# Patient Record
Sex: FEMALE
[De-identification: names, ages, dates, MRNs, and addresses within clinical notes are randomized per-mention and may not be internally consistent; named-entity substitution may affect disease eponyms.]

---

## 2019-11-18 PROBLEM — Z00.00 ENCOUNTER FOR PREVENTIVE HEALTH EXAMINATION: Status: ACTIVE | Noted: 2019-11-18

## 2019-11-19 ENCOUNTER — APPOINTMENT (OUTPATIENT)
Dept: GYNECOLOGIC ONCOLOGY | Facility: CLINIC | Age: 79
End: 2019-11-19

## 2020-01-01 ENCOUNTER — EMERGENCY (EMERGENCY)
Facility: HOSPITAL | Age: 80
LOS: 1 days | Discharge: ANOTHER HOSPITAL | End: 2020-01-01
Attending: EMERGENCY MEDICINE | Admitting: EMERGENCY MEDICINE
Payer: MEDICARE

## 2020-01-01 ENCOUNTER — INPATIENT (INPATIENT)
Facility: HOSPITAL | Age: 80
LOS: 3 days | DRG: 871 | End: 2020-05-01
Attending: INTERNAL MEDICINE | Admitting: INTERNAL MEDICINE
Payer: MEDICARE

## 2020-01-01 VITALS
OXYGEN SATURATION: 70 % | DIASTOLIC BLOOD PRESSURE: 27 MMHG | RESPIRATION RATE: 32 BRPM | HEART RATE: 39 BPM | SYSTOLIC BLOOD PRESSURE: 56 MMHG

## 2020-01-01 VITALS
TEMPERATURE: 98 F | OXYGEN SATURATION: 97 % | RESPIRATION RATE: 20 BRPM | HEART RATE: 101 BPM | DIASTOLIC BLOOD PRESSURE: 50 MMHG | HEIGHT: 61 IN | WEIGHT: 100.09 LBS | SYSTOLIC BLOOD PRESSURE: 95 MMHG

## 2020-01-01 VITALS
HEART RATE: 90 BPM | WEIGHT: 99.21 LBS | RESPIRATION RATE: 20 BRPM | SYSTOLIC BLOOD PRESSURE: 95 MMHG | TEMPERATURE: 100 F | OXYGEN SATURATION: 95 % | DIASTOLIC BLOOD PRESSURE: 59 MMHG

## 2020-01-01 VITALS
OXYGEN SATURATION: 96 % | SYSTOLIC BLOOD PRESSURE: 115 MMHG | DIASTOLIC BLOOD PRESSURE: 58 MMHG | HEART RATE: 96 BPM | TEMPERATURE: 103 F

## 2020-01-01 DIAGNOSIS — J12.81 PNEUMONIA DUE TO SARS-ASSOCIATED CORONAVIRUS: ICD-10-CM

## 2020-01-01 DIAGNOSIS — U07.1 COVID-19: ICD-10-CM

## 2020-01-01 DIAGNOSIS — I95.9 HYPOTENSION, UNSPECIFIED: ICD-10-CM

## 2020-01-01 DIAGNOSIS — N17.9 ACUTE KIDNEY FAILURE, UNSPECIFIED: ICD-10-CM

## 2020-01-01 DIAGNOSIS — J96.01 ACUTE RESPIRATORY FAILURE WITH HYPOXIA: ICD-10-CM

## 2020-01-01 DIAGNOSIS — N18.9 CHRONIC KIDNEY DISEASE, UNSPECIFIED: ICD-10-CM

## 2020-01-01 DIAGNOSIS — Z71.89 OTHER SPECIFIED COUNSELING: ICD-10-CM

## 2020-01-01 DIAGNOSIS — R41.82 ALTERED MENTAL STATUS, UNSPECIFIED: ICD-10-CM

## 2020-01-01 LAB
ABO RH CONFIRMATION: SIGNIFICANT CHANGE UP
ALBUMIN SERPL ELPH-MCNC: 1.8 G/DL — LOW (ref 3.3–5)
ALBUMIN SERPL ELPH-MCNC: 2.5 G/DL — LOW (ref 3.3–5)
ALBUMIN SERPL ELPH-MCNC: 2.7 G/DL — LOW (ref 3.3–5)
ALBUMIN SERPL ELPH-MCNC: 3.2 G/DL — LOW (ref 3.3–5)
ALP SERPL-CCNC: 142 U/L — HIGH (ref 40–120)
ALP SERPL-CCNC: 159 U/L — HIGH (ref 30–120)
ALP SERPL-CCNC: 168 U/L — HIGH (ref 40–120)
ALP SERPL-CCNC: 210 U/L — HIGH (ref 40–120)
ALT FLD-CCNC: 141 U/L — HIGH (ref 12–78)
ALT FLD-CCNC: 37 U/L — SIGNIFICANT CHANGE UP (ref 12–78)
ALT FLD-CCNC: 48 U/L — SIGNIFICANT CHANGE UP (ref 12–78)
ALT FLD-CCNC: 49 U/L DA — SIGNIFICANT CHANGE UP (ref 10–60)
ANION GAP SERPL CALC-SCNC: 14 MMOL/L — SIGNIFICANT CHANGE UP (ref 5–17)
ANION GAP SERPL CALC-SCNC: 15 MMOL/L — SIGNIFICANT CHANGE UP (ref 5–17)
ANION GAP SERPL CALC-SCNC: 16 MMOL/L — SIGNIFICANT CHANGE UP (ref 5–17)
ANION GAP SERPL CALC-SCNC: 18 MMOL/L — HIGH (ref 5–17)
ANION GAP SERPL CALC-SCNC: 32 MMOL/L — HIGH (ref 5–17)
APPEARANCE UR: CLEAR — SIGNIFICANT CHANGE UP
APTT BLD: 105 SEC — HIGH (ref 28.5–37)
APTT BLD: 117.1 SEC — HIGH (ref 28.5–37)
APTT BLD: 123.3 SEC — CRITICAL HIGH (ref 28.5–37)
APTT BLD: 169.7 SEC — CRITICAL HIGH (ref 28.5–37)
APTT BLD: 30.5 SEC — SIGNIFICANT CHANGE UP (ref 28.5–37)
APTT BLD: 73.4 SEC — HIGH (ref 28.5–37)
APTT BLD: > 200 SEC (ref 28.5–37)
APTT BLD: >200 SEC — CRITICAL HIGH (ref 28.5–37)
AST SERPL-CCNC: 102 U/L — HIGH (ref 10–40)
AST SERPL-CCNC: 107 U/L — HIGH (ref 15–37)
AST SERPL-CCNC: 171 U/L — HIGH (ref 15–37)
AST SERPL-CCNC: 501 U/L — HIGH (ref 15–37)
BASE EXCESS BLDA CALC-SCNC: -21.5 MMOL/L — LOW (ref -2–2)
BASE EXCESS BLDA CALC-SCNC: -9 MMOL/L — LOW (ref -2–2)
BASE EXCESS BLDV CALC-SCNC: -8.1 MMOL/L — LOW (ref -2–2)
BASOPHILS # BLD AUTO: 0 K/UL — SIGNIFICANT CHANGE UP (ref 0–0.2)
BASOPHILS # BLD AUTO: 0.01 K/UL — SIGNIFICANT CHANGE UP (ref 0–0.2)
BASOPHILS NFR BLD AUTO: 0 % — SIGNIFICANT CHANGE UP (ref 0–2)
BASOPHILS NFR BLD AUTO: 0.3 % — SIGNIFICANT CHANGE UP (ref 0–2)
BILIRUB SERPL-MCNC: 0.4 MG/DL — SIGNIFICANT CHANGE UP (ref 0.2–1.2)
BILIRUB SERPL-MCNC: 0.4 MG/DL — SIGNIFICANT CHANGE UP (ref 0.2–1.2)
BILIRUB SERPL-MCNC: 0.6 MG/DL — SIGNIFICANT CHANGE UP (ref 0.2–1.2)
BILIRUB SERPL-MCNC: 0.7 MG/DL — SIGNIFICANT CHANGE UP (ref 0.2–1.2)
BILIRUB UR-MCNC: NEGATIVE — SIGNIFICANT CHANGE UP
BLOOD GAS COMMENTS ARTERIAL: SIGNIFICANT CHANGE UP
BLOOD GAS COMMENTS, VENOUS: SIGNIFICANT CHANGE UP
BUN SERPL-MCNC: 102 MG/DL — HIGH (ref 7–23)
BUN SERPL-MCNC: 109 MG/DL — HIGH (ref 7–23)
BUN SERPL-MCNC: 64 MG/DL — HIGH (ref 7–23)
BUN SERPL-MCNC: 74 MG/DL — HIGH (ref 7–23)
BUN SERPL-MCNC: 85 MG/DL — HIGH (ref 7–23)
CALCIUM SERPL-MCNC: 7.1 MG/DL — LOW (ref 8.5–10.1)
CALCIUM SERPL-MCNC: 8.1 MG/DL — LOW (ref 8.5–10.1)
CALCIUM SERPL-MCNC: 8.6 MG/DL — SIGNIFICANT CHANGE UP (ref 8.4–10.5)
CHLORIDE SERPL-SCNC: 100 MMOL/L — SIGNIFICANT CHANGE UP (ref 96–108)
CHLORIDE SERPL-SCNC: 102 MMOL/L — SIGNIFICANT CHANGE UP (ref 96–108)
CHLORIDE SERPL-SCNC: 105 MMOL/L — SIGNIFICANT CHANGE UP (ref 96–108)
CHLORIDE SERPL-SCNC: 109 MMOL/L — HIGH (ref 96–108)
CHLORIDE SERPL-SCNC: 110 MMOL/L — HIGH (ref 96–108)
CK SERPL-CCNC: 1132 U/L — HIGH (ref 26–192)
CO2 SERPL-SCNC: 18 MMOL/L — LOW (ref 22–31)
CO2 SERPL-SCNC: 18 MMOL/L — LOW (ref 22–31)
CO2 SERPL-SCNC: 19 MMOL/L — LOW (ref 22–31)
CO2 SERPL-SCNC: 22 MMOL/L — SIGNIFICANT CHANGE UP (ref 22–31)
CO2 SERPL-SCNC: 8 MMOL/L — CRITICAL LOW (ref 22–31)
COLOR SPEC: YELLOW — SIGNIFICANT CHANGE UP
CREAT SERPL-MCNC: 4.21 MG/DL — HIGH (ref 0.5–1.3)
CREAT SERPL-MCNC: 5.3 MG/DL — HIGH (ref 0.5–1.3)
CREAT SERPL-MCNC: 5.7 MG/DL — HIGH (ref 0.5–1.3)
CREAT SERPL-MCNC: 6.6 MG/DL — HIGH (ref 0.5–1.3)
CREAT SERPL-MCNC: 7 MG/DL — HIGH (ref 0.5–1.3)
CRP SERPL-MCNC: 26.3 MG/DL — HIGH (ref 0–0.4)
CRP SERPL-MCNC: 29 MG/DL — HIGH (ref 0–0.4)
CRP SERPL-MCNC: 31.77 MG/DL — HIGH (ref 0–0.4)
CRP SERPL-MCNC: 34.81 MG/DL — HIGH (ref 0–0.4)
CRP SERPL-MCNC: 37.47 MG/DL — HIGH (ref 0–0.4)
CULTURE RESULTS: SIGNIFICANT CHANGE UP
D DIMER BLD IA.RAPID-MCNC: 2041 NG/ML DDU — HIGH
D DIMER BLD IA.RAPID-MCNC: 2117 NG/ML DDU — HIGH
D DIMER BLD IA.RAPID-MCNC: 2288 NG/ML DDU — HIGH
D DIMER BLD IA.RAPID-MCNC: 2457 NG/ML DDU — HIGH
D DIMER BLD IA.RAPID-MCNC: 2905 NG/ML DDU — HIGH
DIFF PNL FLD: ABNORMAL
EOSINOPHIL # BLD AUTO: 0 K/UL — SIGNIFICANT CHANGE UP (ref 0–0.5)
EOSINOPHIL NFR BLD AUTO: 0 % — SIGNIFICANT CHANGE UP (ref 0–6)
ERYTHROCYTE [SEDIMENTATION RATE] IN BLOOD: >140 — SIGNIFICANT CHANGE UP (ref 0–20)
FERRITIN SERPL-MCNC: 2919 NG/ML — HIGH (ref 15–150)
FERRITIN SERPL-MCNC: 5796 NG/ML — HIGH (ref 15–150)
FERRITIN SERPL-MCNC: HIGH NG/ML (ref 15–150)
FIBRINOGEN PPP-MCNC: 880 MG/DL — HIGH (ref 320–500)
GLUCOSE SERPL-MCNC: 165 MG/DL — HIGH (ref 70–99)
GLUCOSE SERPL-MCNC: 168 MG/DL — HIGH (ref 70–99)
GLUCOSE SERPL-MCNC: 199 MG/DL — HIGH (ref 70–99)
GLUCOSE SERPL-MCNC: 246 MG/DL — HIGH (ref 70–99)
GLUCOSE SERPL-MCNC: 255 MG/DL — HIGH (ref 70–99)
GLUCOSE UR QL: 50 MG/DL
HCO3 BLDA-SCNC: 17 MMOL/L — LOW (ref 23–27)
HCO3 BLDA-SCNC: 8 MMOL/L — LOW (ref 23–27)
HCO3 BLDV-SCNC: 17 MMOL/L — LOW (ref 21–29)
HCT VFR BLD CALC: 18.3 % — CRITICAL LOW (ref 34.5–45)
HCT VFR BLD CALC: 23.6 % — LOW (ref 34.5–45)
HCT VFR BLD CALC: 24.1 % — LOW (ref 34.5–45)
HCT VFR BLD CALC: 26.8 % — LOW (ref 34.5–45)
HCT VFR BLD CALC: 26.9 % — LOW (ref 34.5–45)
HCT VFR BLD CALC: 29 % — LOW (ref 34.5–45)
HGB BLD-MCNC: 5.7 G/DL — CRITICAL LOW (ref 11.5–15.5)
HGB BLD-MCNC: 7.6 G/DL — LOW (ref 11.5–15.5)
HGB BLD-MCNC: 7.8 G/DL — LOW (ref 11.5–15.5)
HGB BLD-MCNC: 8.3 G/DL — LOW (ref 11.5–15.5)
HGB BLD-MCNC: 8.9 G/DL — LOW (ref 11.5–15.5)
HGB BLD-MCNC: 9.2 G/DL — LOW (ref 11.5–15.5)
HOROWITZ INDEX BLDA+IHG-RTO: SIGNIFICANT CHANGE UP
HOROWITZ INDEX BLDA+IHG-RTO: SIGNIFICANT CHANGE UP
HOROWITZ INDEX BLDV+IHG-RTO: 21 — SIGNIFICANT CHANGE UP
HYPOCHROMIA BLD QL: SLIGHT — SIGNIFICANT CHANGE UP
IMM GRANULOCYTES NFR BLD AUTO: 0.8 % — SIGNIFICANT CHANGE UP (ref 0–1.5)
IMM GRANULOCYTES NFR BLD AUTO: 11.1 % — HIGH (ref 0–1.5)
INR BLD: 0.97 RATIO — SIGNIFICANT CHANGE UP (ref 0.88–1.16)
INR BLD: 1.28 RATIO — HIGH (ref 0.88–1.16)
KETONES UR-MCNC: ABNORMAL
LACTATE SERPL-SCNC: 2.3 MMOL/L — HIGH (ref 0.7–2)
LDH SERPL L TO P-CCNC: 1450 U/L — HIGH (ref 50–242)
LDH SERPL L TO P-CCNC: 653 U/L — HIGH (ref 50–242)
LDH SERPL L TO P-CCNC: 992 U/L — HIGH (ref 50–242)
LEUKOCYTE ESTERASE UR-ACNC: ABNORMAL
LG PLATELETS BLD QL AUTO: SLIGHT — SIGNIFICANT CHANGE UP
LYMPHOCYTES # BLD AUTO: 0.34 K/UL — LOW (ref 1–3.3)
LYMPHOCYTES # BLD AUTO: 0.46 K/UL — LOW (ref 1–3.3)
LYMPHOCYTES # BLD AUTO: 0.72 K/UL — LOW (ref 1–3.3)
LYMPHOCYTES # BLD AUTO: 1.97 K/UL — SIGNIFICANT CHANGE UP (ref 1–3.3)
LYMPHOCYTES # BLD AUTO: 26 % — SIGNIFICANT CHANGE UP (ref 13–44)
LYMPHOCYTES # BLD AUTO: 28 % — SIGNIFICANT CHANGE UP (ref 13–44)
LYMPHOCYTES # BLD AUTO: 44.4 % — HIGH (ref 13–44)
LYMPHOCYTES # BLD AUTO: 49.6 % — HIGH (ref 13–44)
MAGNESIUM SERPL-MCNC: 2.4 MG/DL — SIGNIFICANT CHANGE UP (ref 1.6–2.6)
MAGNESIUM SERPL-MCNC: 2.8 MG/DL — HIGH (ref 1.6–2.6)
MANUAL SMEAR VERIFICATION: SIGNIFICANT CHANGE UP
MCHC RBC-ENTMCNC: 28.6 PG — SIGNIFICANT CHANGE UP (ref 27–34)
MCHC RBC-ENTMCNC: 28.8 PG — SIGNIFICANT CHANGE UP (ref 27–34)
MCHC RBC-ENTMCNC: 28.8 PG — SIGNIFICANT CHANGE UP (ref 27–34)
MCHC RBC-ENTMCNC: 29 PG — SIGNIFICANT CHANGE UP (ref 27–34)
MCHC RBC-ENTMCNC: 29.4 PG — SIGNIFICANT CHANGE UP (ref 27–34)
MCHC RBC-ENTMCNC: 31.1 GM/DL — LOW (ref 32–36)
MCHC RBC-ENTMCNC: 31.7 GM/DL — LOW (ref 32–36)
MCHC RBC-ENTMCNC: 32.2 GM/DL — SIGNIFICANT CHANGE UP (ref 32–36)
MCHC RBC-ENTMCNC: 32.4 GM/DL — SIGNIFICANT CHANGE UP (ref 32–36)
MCHC RBC-ENTMCNC: 33.1 GM/DL — SIGNIFICANT CHANGE UP (ref 32–36)
MCV RBC AUTO: 88.3 FL — SIGNIFICANT CHANGE UP (ref 80–100)
MCV RBC AUTO: 88.8 FL — SIGNIFICANT CHANGE UP (ref 80–100)
MCV RBC AUTO: 89.4 FL — SIGNIFICANT CHANGE UP (ref 80–100)
MCV RBC AUTO: 91.5 FL — SIGNIFICANT CHANGE UP (ref 80–100)
MCV RBC AUTO: 92.4 FL — SIGNIFICANT CHANGE UP (ref 80–100)
METAMYELOCYTES # FLD: 1 % — HIGH (ref 0–0)
MONOCYTES # BLD AUTO: 0.02 K/UL — SIGNIFICANT CHANGE UP (ref 0–0.9)
MONOCYTES # BLD AUTO: 0.05 K/UL — SIGNIFICANT CHANGE UP (ref 0–0.9)
MONOCYTES # BLD AUTO: 0.09 K/UL — SIGNIFICANT CHANGE UP (ref 0–0.9)
MONOCYTES # BLD AUTO: 0.17 K/UL — SIGNIFICANT CHANGE UP (ref 0–0.9)
MONOCYTES NFR BLD AUTO: 2 % — SIGNIFICANT CHANGE UP (ref 2–14)
MONOCYTES NFR BLD AUTO: 3 % — SIGNIFICANT CHANGE UP (ref 2–14)
MONOCYTES NFR BLD AUTO: 4.3 % — SIGNIFICANT CHANGE UP (ref 2–14)
MONOCYTES NFR BLD AUTO: 5.6 % — SIGNIFICANT CHANGE UP (ref 2–14)
NEUTROPHILS # BLD AUTO: 0.63 K/UL — LOW (ref 1.8–7.4)
NEUTROPHILS # BLD AUTO: 0.82 K/UL — LOW (ref 1.8–7.4)
NEUTROPHILS # BLD AUTO: 1.25 K/UL — LOW (ref 1.8–7.4)
NEUTROPHILS # BLD AUTO: 1.79 K/UL — LOW (ref 1.8–7.4)
NEUTROPHILS NFR BLD AUTO: 38.9 % — LOW (ref 43–77)
NEUTROPHILS NFR BLD AUTO: 45 % — SIGNIFICANT CHANGE UP (ref 43–77)
NEUTROPHILS NFR BLD AUTO: 62 % — SIGNIFICANT CHANGE UP (ref 43–77)
NEUTROPHILS NFR BLD AUTO: 68 % — SIGNIFICANT CHANGE UP (ref 43–77)
NEUTS BAND # BLD: 2 % — SIGNIFICANT CHANGE UP (ref 0–8)
NEUTS BAND # BLD: 6 % — SIGNIFICANT CHANGE UP (ref 0–8)
NITRITE UR-MCNC: NEGATIVE — SIGNIFICANT CHANGE UP
NRBC # BLD: 0 /100 WBCS — SIGNIFICANT CHANGE UP (ref 0–0)
NRBC # BLD: 0 — SIGNIFICANT CHANGE UP
NRBC # BLD: 0 — SIGNIFICANT CHANGE UP
NRBC # BLD: 1 /100 WBCS — HIGH (ref 0–0)
NRBC # BLD: 5 /100 WBCS — HIGH (ref 0–0)
NRBC # BLD: SIGNIFICANT CHANGE UP /100 WBCS (ref 0–0)
NRBC # BLD: SIGNIFICANT CHANGE UP /100 WBCS (ref 0–0)
NT-PROBNP SERPL-SCNC: 2870 PG/ML — HIGH (ref 0–450)
PCO2 BLDA: 34 MMHG — SIGNIFICANT CHANGE UP (ref 32–46)
PCO2 BLDA: 44 MMHG — SIGNIFICANT CHANGE UP (ref 32–46)
PCO2 BLDV: 37 MMHG — SIGNIFICANT CHANGE UP (ref 35–50)
PH BLDA: 6.98 — CRITICAL LOW (ref 7.35–7.45)
PH BLDA: 7.22 — LOW (ref 7.35–7.45)
PH BLDV: 7.29 — LOW (ref 7.35–7.45)
PH UR: 6 — SIGNIFICANT CHANGE UP (ref 5–8)
PHOSPHATE SERPL-MCNC: 13.2 MG/DL — HIGH (ref 2.5–4.5)
PHOSPHATE SERPL-MCNC: 9.2 MG/DL — HIGH (ref 2.5–4.5)
PLAT MORPH BLD: NORMAL — SIGNIFICANT CHANGE UP
PLATELET # BLD AUTO: 144 K/UL — LOW (ref 150–400)
PLATELET # BLD AUTO: 146 K/UL — LOW (ref 150–400)
PLATELET # BLD AUTO: 151 K/UL — SIGNIFICANT CHANGE UP (ref 150–400)
PLATELET # BLD AUTO: 182 K/UL — SIGNIFICANT CHANGE UP (ref 150–400)
PLATELET # BLD AUTO: 186 K/UL — SIGNIFICANT CHANGE UP (ref 150–400)
PLATELET CLUMP BLD QL SMEAR: ABNORMAL
PO2 BLDA: 119 MMHG — HIGH (ref 74–108)
PO2 BLDA: 125 MMHG — HIGH (ref 74–108)
PO2 BLDV: <44 MMHG — SIGNIFICANT CHANGE UP (ref 25–45)
POTASSIUM SERPL-MCNC: 3.6 MMOL/L — SIGNIFICANT CHANGE UP (ref 3.5–5.3)
POTASSIUM SERPL-MCNC: 3.7 MMOL/L — SIGNIFICANT CHANGE UP (ref 3.5–5.3)
POTASSIUM SERPL-MCNC: 4 MMOL/L — SIGNIFICANT CHANGE UP (ref 3.5–5.3)
POTASSIUM SERPL-MCNC: 4.1 MMOL/L — SIGNIFICANT CHANGE UP (ref 3.5–5.3)
POTASSIUM SERPL-MCNC: 5.7 MMOL/L — HIGH (ref 3.5–5.3)
POTASSIUM SERPL-SCNC: 3.6 MMOL/L — SIGNIFICANT CHANGE UP (ref 3.5–5.3)
POTASSIUM SERPL-SCNC: 3.7 MMOL/L — SIGNIFICANT CHANGE UP (ref 3.5–5.3)
POTASSIUM SERPL-SCNC: 4 MMOL/L — SIGNIFICANT CHANGE UP (ref 3.5–5.3)
POTASSIUM SERPL-SCNC: 4.1 MMOL/L — SIGNIFICANT CHANGE UP (ref 3.5–5.3)
POTASSIUM SERPL-SCNC: 5.7 MMOL/L — HIGH (ref 3.5–5.3)
PROCALCITONIN SERPL-MCNC: 8.43 NG/ML — HIGH (ref 0.02–0.1)
PROT SERPL-MCNC: 6.2 G/DL — SIGNIFICANT CHANGE UP (ref 6–8.3)
PROT SERPL-MCNC: 7.6 G/DL — SIGNIFICANT CHANGE UP (ref 6–8.3)
PROT SERPL-MCNC: 7.7 G/DL — SIGNIFICANT CHANGE UP (ref 6–8.3)
PROT SERPL-MCNC: 8.7 G/DL — HIGH (ref 6–8.3)
PROT UR-MCNC: 100
PROTHROM AB SERPL-ACNC: 10.7 SEC — SIGNIFICANT CHANGE UP (ref 10–12.9)
PROTHROM AB SERPL-ACNC: 14.4 SEC — HIGH (ref 10–12.9)
RBC # BLD: 1.98 M/UL — LOW (ref 3.8–5.2)
RBC # BLD: 2.64 M/UL — LOW (ref 3.8–5.2)
RBC # BLD: 2.73 M/UL — LOW (ref 3.8–5.2)
RBC # BLD: 3.03 M/UL — LOW (ref 3.8–5.2)
RBC # BLD: 3.17 M/UL — LOW (ref 3.8–5.2)
RBC # FLD: 16.2 % — HIGH (ref 10.3–14.5)
RBC # FLD: 16.4 % — HIGH (ref 10.3–14.5)
RBC # FLD: 16.5 % — HIGH (ref 10.3–14.5)
RBC # FLD: 16.6 % — HIGH (ref 10.3–14.5)
RBC # FLD: 16.6 % — HIGH (ref 10.3–14.5)
RBC BLD AUTO: NORMAL — SIGNIFICANT CHANGE UP
RBC BLD AUTO: SIGNIFICANT CHANGE UP
RBC BLD AUTO: SIGNIFICANT CHANGE UP
SAO2 % BLDA: 94 % — SIGNIFICANT CHANGE UP (ref 92–96)
SAO2 % BLDA: 97 % — HIGH (ref 92–96)
SAO2 % BLDV: 64 % — LOW (ref 67–88)
SARS-COV-2 RNA SPEC QL NAA+PROBE: DETECTED
SODIUM SERPL-SCNC: 138 MMOL/L — SIGNIFICANT CHANGE UP (ref 135–145)
SODIUM SERPL-SCNC: 142 MMOL/L — SIGNIFICANT CHANGE UP (ref 135–145)
SP GR SPEC: 1.01 — SIGNIFICANT CHANGE UP (ref 1.01–1.02)
SPECIMEN SOURCE: SIGNIFICANT CHANGE UP
TROPONIN I SERPL-MCNC: 1.56 NG/ML — HIGH (ref 0.01–0.04)
TROPONIN I SERPL-MCNC: 1.6 NG/ML — HIGH (ref 0.01–0.04)
TROPONIN I SERPL-MCNC: 1.66 NG/ML — HIGH (ref 0.01–0.04)
TROPONIN I SERPL-MCNC: 1.81 NG/ML — HIGH (ref 0.02–0.06)
TROPONIN I SERPL-MCNC: 2 NG/ML — HIGH (ref 0.01–0.04)
TROPONIN I SERPL-MCNC: 2.03 NG/ML — HIGH (ref 0.01–0.04)
TROPONIN I SERPL-MCNC: 2.17 NG/ML — HIGH (ref 0.01–0.04)
TROPONIN I SERPL-MCNC: 2.2 NG/ML — HIGH (ref 0.01–0.04)
TROPONIN I SERPL-MCNC: 2.48 NG/ML — HIGH (ref 0.01–0.04)
TSH SERPL-MCNC: 0.26 UIU/ML — LOW (ref 0.36–3.74)
UROBILINOGEN FLD QL: NEGATIVE — SIGNIFICANT CHANGE UP
VARIANT LYMPHS # BLD: 1 % — SIGNIFICANT CHANGE UP (ref 0–6)
VARIANT LYMPHS # BLD: 1 % — SIGNIFICANT CHANGE UP (ref 0–6)
WBC # BLD: 1.2 K/UL — LOW (ref 3.8–10.5)
WBC # BLD: 1.27 K/UL — LOW (ref 3.8–10.5)
WBC # BLD: 1.62 K/UL — LOW (ref 3.8–10.5)
WBC # BLD: 1.78 K/UL — LOW (ref 3.8–10.5)
WBC # BLD: 3.97 K/UL — SIGNIFICANT CHANGE UP (ref 3.8–10.5)
WBC # FLD AUTO: 1.2 K/UL — LOW (ref 3.8–10.5)
WBC # FLD AUTO: 1.27 K/UL — LOW (ref 3.8–10.5)
WBC # FLD AUTO: 1.62 K/UL — LOW (ref 3.8–10.5)
WBC # FLD AUTO: 1.78 K/UL — LOW (ref 3.8–10.5)
WBC # FLD AUTO: 3.97 K/UL — SIGNIFICANT CHANGE UP (ref 3.8–10.5)

## 2020-01-01 PROCEDURE — 99285 EMERGENCY DEPT VISIT HI MDM: CPT | Mod: CS

## 2020-01-01 PROCEDURE — 84145 PROCALCITONIN (PCT): CPT

## 2020-01-01 PROCEDURE — 85379 FIBRIN DEGRADATION QUANT: CPT

## 2020-01-01 PROCEDURE — 93005 ELECTROCARDIOGRAM TRACING: CPT

## 2020-01-01 PROCEDURE — 85384 FIBRINOGEN ACTIVITY: CPT

## 2020-01-01 PROCEDURE — 84484 ASSAY OF TROPONIN QUANT: CPT

## 2020-01-01 PROCEDURE — 76770 US EXAM ABDO BACK WALL COMP: CPT | Mod: 26

## 2020-01-01 PROCEDURE — 83880 ASSAY OF NATRIURETIC PEPTIDE: CPT

## 2020-01-01 PROCEDURE — 85610 PROTHROMBIN TIME: CPT

## 2020-01-01 PROCEDURE — 85730 THROMBOPLASTIN TIME PARTIAL: CPT

## 2020-01-01 PROCEDURE — 87635 SARS-COV-2 COVID-19 AMP PRB: CPT

## 2020-01-01 PROCEDURE — 82728 ASSAY OF FERRITIN: CPT

## 2020-01-01 PROCEDURE — 80053 COMPREHEN METABOLIC PANEL: CPT

## 2020-01-01 PROCEDURE — 99291 CRITICAL CARE FIRST HOUR: CPT | Mod: CS

## 2020-01-01 PROCEDURE — 71045 X-RAY EXAM CHEST 1 VIEW: CPT | Mod: 26,77

## 2020-01-01 PROCEDURE — 71045 X-RAY EXAM CHEST 1 VIEW: CPT | Mod: 26,CS,77

## 2020-01-01 PROCEDURE — 93010 ELECTROCARDIOGRAM REPORT: CPT

## 2020-01-01 PROCEDURE — 86140 C-REACTIVE PROTEIN: CPT

## 2020-01-01 PROCEDURE — 83605 ASSAY OF LACTIC ACID: CPT

## 2020-01-01 PROCEDURE — 99285 EMERGENCY DEPT VISIT HI MDM: CPT | Mod: 25

## 2020-01-01 PROCEDURE — 71045 X-RAY EXAM CHEST 1 VIEW: CPT | Mod: 26

## 2020-01-01 PROCEDURE — 87040 BLOOD CULTURE FOR BACTERIA: CPT

## 2020-01-01 PROCEDURE — 36415 COLL VENOUS BLD VENIPUNCTURE: CPT

## 2020-01-01 PROCEDURE — 71045 X-RAY EXAM CHEST 1 VIEW: CPT

## 2020-01-01 PROCEDURE — 85027 COMPLETE CBC AUTOMATED: CPT

## 2020-01-01 RX ORDER — LACTOBACILLUS ACIDOPHILUS 100MM CELL
1 CAPSULE ORAL
Qty: 0 | Refills: 0 | DISCHARGE

## 2020-01-01 RX ORDER — VASOPRESSIN 20 [USP'U]/ML
0.04 INJECTION INTRAVENOUS
Qty: 50 | Refills: 0 | Status: DISCONTINUED | OUTPATIENT
Start: 2020-01-01 | End: 2020-01-01

## 2020-01-01 RX ORDER — PIPERACILLIN AND TAZOBACTAM 4; .5 G/20ML; G/20ML
3.38 INJECTION, POWDER, LYOPHILIZED, FOR SOLUTION INTRAVENOUS EVERY 12 HOURS
Refills: 0 | Status: DISCONTINUED | OUTPATIENT
Start: 2020-01-01 | End: 2020-01-01

## 2020-01-01 RX ORDER — ACETAMINOPHEN 500 MG
650 TABLET ORAL EVERY 6 HOURS
Refills: 0 | Status: DISCONTINUED | OUTPATIENT
Start: 2020-01-01 | End: 2020-01-01

## 2020-01-01 RX ORDER — FAMOTIDINE 10 MG/ML
20 INJECTION INTRAVENOUS
Refills: 0 | Status: DISCONTINUED | OUTPATIENT
Start: 2020-01-01 | End: 2020-01-01

## 2020-01-01 RX ORDER — SODIUM CHLORIDE 9 MG/ML
1000 INJECTION INTRAMUSCULAR; INTRAVENOUS; SUBCUTANEOUS
Refills: 0 | Status: DISCONTINUED | OUTPATIENT
Start: 2020-01-01 | End: 2020-01-01

## 2020-01-01 RX ORDER — HEPARIN SODIUM 5000 [USP'U]/ML
3500 INJECTION INTRAVENOUS; SUBCUTANEOUS ONCE
Refills: 0 | Status: COMPLETED | OUTPATIENT
Start: 2020-01-01 | End: 2020-01-01

## 2020-01-01 RX ORDER — SODIUM BICARBONATE 1 MEQ/ML
0.25 SYRINGE (ML) INTRAVENOUS
Qty: 150 | Refills: 0 | Status: DISCONTINUED | OUTPATIENT
Start: 2020-01-01 | End: 2020-01-01

## 2020-01-01 RX ORDER — ACETAMINOPHEN 500 MG
650 TABLET ORAL ONCE
Refills: 0 | Status: COMPLETED | OUTPATIENT
Start: 2020-01-01 | End: 2020-01-01

## 2020-01-01 RX ORDER — CEFTRIAXONE 500 MG/1
1000 INJECTION, POWDER, FOR SOLUTION INTRAMUSCULAR; INTRAVENOUS EVERY 24 HOURS
Refills: 0 | Status: DISCONTINUED | OUTPATIENT
Start: 2020-01-01 | End: 2020-01-01

## 2020-01-01 RX ORDER — SODIUM CHLORIDE 9 MG/ML
1000 INJECTION INTRAMUSCULAR; INTRAVENOUS; SUBCUTANEOUS ONCE
Refills: 0 | Status: COMPLETED | OUTPATIENT
Start: 2020-01-01 | End: 2020-01-01

## 2020-01-01 RX ORDER — SODIUM BICARBONATE 1 MEQ/ML
650 SYRINGE (ML) INTRAVENOUS THREE TIMES A DAY
Refills: 0 | Status: COMPLETED | OUTPATIENT
Start: 2020-01-01 | End: 2020-01-01

## 2020-01-01 RX ORDER — HEPARIN SODIUM 5000 [USP'U]/ML
INJECTION INTRAVENOUS; SUBCUTANEOUS
Qty: 25000 | Refills: 0 | Status: DISCONTINUED | OUTPATIENT
Start: 2020-01-01 | End: 2020-01-01

## 2020-01-01 RX ORDER — FENTANYL CITRATE 50 UG/ML
50 INJECTION INTRAVENOUS
Refills: 0 | Status: DISCONTINUED | OUTPATIENT
Start: 2020-01-01 | End: 2020-01-01

## 2020-01-01 RX ORDER — MULTIVIT-MIN/FERROUS GLUCONATE 9 MG/15 ML
1 LIQUID (ML) ORAL
Qty: 0 | Refills: 0 | DISCHARGE

## 2020-01-01 RX ORDER — CHLORHEXIDINE GLUCONATE 213 G/1000ML
15 SOLUTION TOPICAL EVERY 12 HOURS
Refills: 0 | Status: DISCONTINUED | OUTPATIENT
Start: 2020-01-01 | End: 2020-01-01

## 2020-01-01 RX ORDER — NOREPINEPHRINE BITARTRATE/D5W 8 MG/250ML
0.05 PLASTIC BAG, INJECTION (ML) INTRAVENOUS
Qty: 8 | Refills: 0 | Status: DISCONTINUED | OUTPATIENT
Start: 2020-01-01 | End: 2020-01-01

## 2020-01-01 RX ORDER — HYDROCORTISONE 1 %
1 OINTMENT (GRAM) TOPICAL
Qty: 0 | Refills: 0 | DISCHARGE

## 2020-01-01 RX ORDER — AMLODIPINE BESYLATE 2.5 MG/1
1 TABLET ORAL
Qty: 0 | Refills: 0 | DISCHARGE

## 2020-01-01 RX ORDER — ARGATROBAN 50 MG/50ML
2 INJECTION, SOLUTION INTRAVENOUS
Qty: 250 | Refills: 0 | Status: DISCONTINUED | OUTPATIENT
Start: 2020-01-01 | End: 2020-01-01

## 2020-01-01 RX ORDER — SODIUM CHLORIDE 9 MG/ML
1000 INJECTION, SOLUTION INTRAVENOUS
Refills: 0 | Status: DISCONTINUED | OUTPATIENT
Start: 2020-01-01 | End: 2020-01-01

## 2020-01-01 RX ORDER — POLYETHYLENE GLYCOL 3350 17 G/17G
17 POWDER, FOR SOLUTION ORAL
Qty: 0 | Refills: 0 | DISCHARGE

## 2020-01-01 RX ORDER — SODIUM BICARBONATE 1 MEQ/ML
50 SYRINGE (ML) INTRAVENOUS ONCE
Refills: 0 | Status: COMPLETED | OUTPATIENT
Start: 2020-01-01 | End: 2020-01-01

## 2020-01-01 RX ORDER — PROPOFOL 10 MG/ML
30 INJECTION, EMULSION INTRAVENOUS
Qty: 1000 | Refills: 0 | Status: DISCONTINUED | OUTPATIENT
Start: 2020-01-01 | End: 2020-01-01

## 2020-01-01 RX ORDER — ACETAMINOPHEN 500 MG
1 TABLET ORAL
Qty: 0 | Refills: 0 | DISCHARGE

## 2020-01-01 RX ORDER — HEPARIN SODIUM 5000 [USP'U]/ML
5000 INJECTION INTRAVENOUS; SUBCUTANEOUS EVERY 8 HOURS
Refills: 0 | Status: DISCONTINUED | OUTPATIENT
Start: 2020-01-01 | End: 2020-01-01

## 2020-01-01 RX ORDER — HYDRALAZINE HCL 50 MG
1 TABLET ORAL
Qty: 0 | Refills: 0 | DISCHARGE

## 2020-01-01 RX ORDER — HEPARIN SODIUM 5000 [USP'U]/ML
1500 INJECTION INTRAVENOUS; SUBCUTANEOUS EVERY 6 HOURS
Refills: 0 | Status: DISCONTINUED | OUTPATIENT
Start: 2020-01-01 | End: 2020-01-01

## 2020-01-01 RX ORDER — LACTOBACILLUS ACIDOPHILUS 100MM CELL
1 CAPSULE ORAL
Refills: 0 | Status: DISCONTINUED | OUTPATIENT
Start: 2020-01-01 | End: 2020-01-01

## 2020-01-01 RX ORDER — HEPARIN SODIUM 5000 [USP'U]/ML
3500 INJECTION INTRAVENOUS; SUBCUTANEOUS EVERY 6 HOURS
Refills: 0 | Status: DISCONTINUED | OUTPATIENT
Start: 2020-01-01 | End: 2020-01-01

## 2020-01-01 RX ORDER — CAPTOPRIL 12.5 MG/1
1 TABLET ORAL
Qty: 0 | Refills: 0 | DISCHARGE

## 2020-01-01 RX ORDER — DEXTROSE 50 % IN WATER 50 %
50 SYRINGE (ML) INTRAVENOUS ONCE
Refills: 0 | Status: COMPLETED | OUTPATIENT
Start: 2020-01-01 | End: 2020-01-01

## 2020-01-01 RX ORDER — INSULIN HUMAN 100 [IU]/ML
5 INJECTION, SOLUTION SUBCUTANEOUS ONCE
Refills: 0 | Status: COMPLETED | OUTPATIENT
Start: 2020-01-01 | End: 2020-01-01

## 2020-01-01 RX ADMIN — HEPARIN SODIUM 800 UNIT(S)/HR: 5000 INJECTION INTRAVENOUS; SUBCUTANEOUS at 09:33

## 2020-01-01 RX ADMIN — SODIUM CHLORIDE 1000 MILLILITER(S): 9 INJECTION INTRAMUSCULAR; INTRAVENOUS; SUBCUTANEOUS at 15:40

## 2020-01-01 RX ADMIN — Medication 650 MILLIGRAM(S): at 03:35

## 2020-01-01 RX ADMIN — HEPARIN SODIUM 5000 UNIT(S): 5000 INJECTION INTRAVENOUS; SUBCUTANEOUS at 05:47

## 2020-01-01 RX ADMIN — CHLORHEXIDINE GLUCONATE 15 MILLILITER(S): 213 SOLUTION TOPICAL at 18:08

## 2020-01-01 RX ADMIN — HEPARIN SODIUM 0 UNIT(S)/HR: 5000 INJECTION INTRAVENOUS; SUBCUTANEOUS at 00:36

## 2020-01-01 RX ADMIN — PIPERACILLIN AND TAZOBACTAM 25 GRAM(S): 4; .5 INJECTION, POWDER, LYOPHILIZED, FOR SOLUTION INTRAVENOUS at 18:09

## 2020-01-01 RX ADMIN — PIPERACILLIN AND TAZOBACTAM 25 GRAM(S): 4; .5 INJECTION, POWDER, LYOPHILIZED, FOR SOLUTION INTRAVENOUS at 05:32

## 2020-01-01 RX ADMIN — Medication 4.22 MICROGRAM(S)/KG/MIN: at 07:50

## 2020-01-01 RX ADMIN — Medication 40 MILLIGRAM(S): at 05:32

## 2020-01-01 RX ADMIN — PROPOFOL 8.1 MICROGRAM(S)/KG/MIN: 10 INJECTION, EMULSION INTRAVENOUS at 20:14

## 2020-01-01 RX ADMIN — FAMOTIDINE 20 MILLIGRAM(S): 10 INJECTION INTRAVENOUS at 06:37

## 2020-01-01 RX ADMIN — Medication 4.22 MICROGRAM(S)/KG/MIN: at 00:50

## 2020-01-01 RX ADMIN — CHLORHEXIDINE GLUCONATE 15 MILLILITER(S): 213 SOLUTION TOPICAL at 06:38

## 2020-01-01 RX ADMIN — SODIUM CHLORIDE 80 MILLILITER(S): 9 INJECTION, SOLUTION INTRAVENOUS at 18:42

## 2020-01-01 RX ADMIN — PIPERACILLIN AND TAZOBACTAM 25 GRAM(S): 4; .5 INJECTION, POWDER, LYOPHILIZED, FOR SOLUTION INTRAVENOUS at 06:36

## 2020-01-01 RX ADMIN — HEPARIN SODIUM 3500 UNIT(S): 5000 INJECTION INTRAVENOUS; SUBCUTANEOUS at 09:30

## 2020-01-01 RX ADMIN — Medication 40 MILLIGRAM(S): at 14:37

## 2020-01-01 RX ADMIN — Medication 50 MILLIEQUIVALENT(S): at 05:32

## 2020-01-01 RX ADMIN — Medication 1 TABLET(S): at 05:48

## 2020-01-01 RX ADMIN — Medication 1 TABLET(S): at 05:32

## 2020-01-01 RX ADMIN — PIPERACILLIN AND TAZOBACTAM 25 GRAM(S): 4; .5 INJECTION, POWDER, LYOPHILIZED, FOR SOLUTION INTRAVENOUS at 17:59

## 2020-01-01 RX ADMIN — VASOPRESSIN 2.4 UNIT(S)/MIN: 20 INJECTION INTRAVENOUS at 23:20

## 2020-01-01 RX ADMIN — Medication 1 TABLET(S): at 18:09

## 2020-01-01 RX ADMIN — Medication 75 MEQ/KG/HR: at 02:19

## 2020-01-01 RX ADMIN — HEPARIN SODIUM 700 UNIT(S)/HR: 5000 INJECTION INTRAVENOUS; SUBCUTANEOUS at 16:58

## 2020-01-01 RX ADMIN — ARGATROBAN 5.4 MICROGRAM(S)/KG/MIN: 50 INJECTION, SOLUTION INTRAVENOUS at 15:30

## 2020-01-01 RX ADMIN — CHLORHEXIDINE GLUCONATE 15 MILLILITER(S): 213 SOLUTION TOPICAL at 17:59

## 2020-01-01 RX ADMIN — VASOPRESSIN 2.4 UNIT(S)/MIN: 20 INJECTION INTRAVENOUS at 09:16

## 2020-01-01 RX ADMIN — SODIUM CHLORIDE 80 MILLILITER(S): 9 INJECTION, SOLUTION INTRAVENOUS at 03:15

## 2020-01-01 RX ADMIN — Medication 650 MILLIGRAM(S): at 19:40

## 2020-01-01 RX ADMIN — SODIUM CHLORIDE 100 MILLILITER(S): 9 INJECTION INTRAMUSCULAR; INTRAVENOUS; SUBCUTANEOUS at 02:03

## 2020-01-01 RX ADMIN — SODIUM CHLORIDE 100 MILLILITER(S): 9 INJECTION INTRAMUSCULAR; INTRAVENOUS; SUBCUTANEOUS at 09:41

## 2020-01-01 RX ADMIN — Medication 50 MILLILITER(S): at 10:43

## 2020-01-01 RX ADMIN — Medication 75 MEQ/KG/HR: at 10:35

## 2020-01-01 RX ADMIN — PIPERACILLIN AND TAZOBACTAM 25 GRAM(S): 4; .5 INJECTION, POWDER, LYOPHILIZED, FOR SOLUTION INTRAVENOUS at 18:42

## 2020-01-01 RX ADMIN — Medication 4.22 MICROGRAM(S)/KG/MIN: at 09:36

## 2020-01-01 RX ADMIN — Medication 4.22 MICROGRAM(S)/KG/MIN: at 00:45

## 2020-01-01 RX ADMIN — Medication 4.22 MICROGRAM(S)/KG/MIN: at 02:36

## 2020-01-01 RX ADMIN — Medication 40 MILLIGRAM(S): at 06:36

## 2020-01-01 RX ADMIN — CHLORHEXIDINE GLUCONATE 15 MILLILITER(S): 213 SOLUTION TOPICAL at 05:32

## 2020-01-01 RX ADMIN — Medication 1 TABLET(S): at 20:01

## 2020-01-01 RX ADMIN — Medication 4.22 MICROGRAM(S)/KG/MIN: at 23:23

## 2020-01-01 RX ADMIN — HEPARIN SODIUM 600 UNIT(S)/HR: 5000 INJECTION INTRAVENOUS; SUBCUTANEOUS at 01:48

## 2020-01-01 RX ADMIN — PROPOFOL 8.1 MICROGRAM(S)/KG/MIN: 10 INJECTION, EMULSION INTRAVENOUS at 06:36

## 2020-01-01 RX ADMIN — Medication 650 MILLIGRAM(S): at 20:01

## 2020-01-01 RX ADMIN — Medication 650 MILLIGRAM(S): at 14:38

## 2020-01-01 RX ADMIN — Medication 4.22 MICROGRAM(S)/KG/MIN: at 03:15

## 2020-01-01 RX ADMIN — SODIUM CHLORIDE 75 MILLILITER(S): 9 INJECTION, SOLUTION INTRAVENOUS at 13:30

## 2020-01-01 RX ADMIN — HEPARIN SODIUM 0 UNIT(S)/HR: 5000 INJECTION INTRAVENOUS; SUBCUTANEOUS at 11:14

## 2020-01-01 RX ADMIN — HEPARIN SODIUM 0 UNIT(S)/HR: 5000 INJECTION INTRAVENOUS; SUBCUTANEOUS at 15:47

## 2020-01-01 RX ADMIN — CEFTRIAXONE 100 MILLIGRAM(S): 500 INJECTION, POWDER, FOR SOLUTION INTRAMUSCULAR; INTRAVENOUS at 00:20

## 2020-01-01 RX ADMIN — Medication 4.22 MICROGRAM(S)/KG/MIN: at 23:01

## 2020-01-01 RX ADMIN — INSULIN HUMAN 5 UNIT(S): 100 INJECTION, SOLUTION SUBCUTANEOUS at 10:43

## 2020-04-27 NOTE — ED PROVIDER NOTE - CONSTITUTIONAL, MLM
normal... Awake, alert, but confused. BP: 95/50. Afebrile. Pulse ox drop to low 80s on room air, but up to mid 90s on 4L nasal cannula.

## 2020-04-27 NOTE — ED PROVIDER NOTE - CARE PLAN
Principal Discharge DX:	COVID-19 virus detected  Secondary Diagnosis:	Renal failure  Secondary Diagnosis:	Viral pneumonia  Secondary Diagnosis:	ACS (acute coronary syndrome)  Secondary Diagnosis:	Hypoxia

## 2020-04-27 NOTE — CONSULT NOTE ADULT - SUBJECTIVE AND OBJECTIVE BOX
History of Present Illness: The patient is an 79 year old female with a history of HTN, dementia who presents with AMS. She is a poor historian. She has been refusing to eat or drink at [facility].    Past Medical/Surgical History:  HTN, dementia    Medications:  Home Medications:  acetaminophen 500 mg oral tablet: 1 tab(s) orally 3 times a day (27 Apr 2020 15:07)  Acidophilus oral tablet: 1 tab(s) orally once a day (27 Apr 2020 15:07)  amLODIPine 10 mg oral tablet: 1 tab(s) orally once a day (27 Apr 2020 15:07)  captopril 25 mg oral tablet: 1 tab(s) orally 2 times a day (27 Apr 2020 15:07)  CertaVite Senior oral tablet: 1 tab(s) orally once a day (27 Apr 2020 15:07)  hydrALAZINE 25 mg oral tablet: 1 tab(s) orally 3 times a day (27 Apr 2020 15:07)  Hydrocort 2.5% topical cream: 1 application rectally once a day (at bedtime) (27 Apr 2020 15:07)  polyethylene glycol 3350 oral powder for reconstitution: 17 gram(s) orally once a day in 8 ounces of water (27 Apr 2020 15:07)  Prosight oral tablet: 1 tab(s) orally once a day (27 Apr 2020 15:07)      Family History: Non-contributory family history of premature cardiovascular atherosclerotic disease    Social History: No tobacco, alcohol or drug use    Review of Systems:  General: No fevers, chills, weight loss or gain  Skin: No rashes, color changes  Cardiovascular: No chest pain, orthopnea  Respiratory: No shortness of breath, cough  Gastrointestinal: No nausea, abdominal pain  Genitourinary: No incontinence, pain with urination  Musculoskeletal: No pain, swelling, decreased range of motion  Neurological: No headache, weakness  Psychiatric: No depression, anxiety  Endocrine: No weight loss or gain, increased thirst  All other systems are comprehensively negative.    Physical Exam:  Vitals:        Vital Signs Last 24 Hrs  T(C): 36.4 (27 Apr 2020 13:58), Max: 36.4 (27 Apr 2020 13:58)  T(F): 97.5 (27 Apr 2020 13:58), Max: 97.5 (27 Apr 2020 13:58)  HR: 101 (27 Apr 2020 13:58) (101 - 101)  BP: 95/50 (27 Apr 2020 13:58) (95/50 - 95/50)  BP(mean): --  RR: 20 (27 Apr 2020 13:58) (20 - 20)  SpO2: 97% (27 Apr 2020 13:58) (97% - 97%)  General: NAD  HEENT: MMM  Neck: No JVD, no carotid bruit  Lungs: CTAB  CV: RRR, nl S1/S2, no M/R/G  Abdomen: S/NT/ND, +BS  Extremities: No LE edema, no cyanosis  Neuro: AAOx3, non-focal  Skin: No rash    Labs:  Pending

## 2020-04-27 NOTE — ED PROVIDER NOTE - OBJECTIVE STATEMENT
80 yo female with h/o dementia, HTN, ovarian cancer (on chemo just finished 3/6 round on 3/21), currently has right renal stent from [facility] transferred from [place] ED for admission for + COVID, renal failure, hypoxia, elevated troponin. As per nursing home patient refusing to eat/drink/take her meds. Patient with Cr 4.2, trop 1.815, + COVID, cxr with bilateral opacities. At [place] patient was initially febrile, fever resolved after tylenol. Patient limited historian. Patient evaluated  by renal Dr. Rodriguez and cards Dr. Giang at [place].

## 2020-04-27 NOTE — CONSULT NOTE ADULT - SUBJECTIVE AND OBJECTIVE BOX
Patient is a 79y old  Female who presents with a chief complaint of ltered mental status.    HPI:  80 y/o female with a PMHx of dementia, presents to the ED for AMS. Pt was sent from [facility]. Pt was refusing to eat, drink, or take medication. Denies any sx when asked. Can not elaborate on sx due to dementia. PCP: Dr. Headley. No other complaints at this time.    Renal consult called for KYLIE. History obtained from chart and patient. Pt is a poor historian.       PAST MEDICAL HISTORY:      PAST SURGICAL HISTORY:      FAMILY HISTORY:      SOCIAL HISTORY: No smoking or alcohol use     Allergies    No Known Allergies    Intolerances      Home Medications:  acetaminophen 500 mg oral tablet: 1 tab(s) orally 3 times a day (27 Apr 2020 15:07)  Acidophilus oral tablet: 1 tab(s) orally once a day (27 Apr 2020 15:07)  amLODIPine 10 mg oral tablet: 1 tab(s) orally once a day (27 Apr 2020 15:07)  captopril 25 mg oral tablet: 1 tab(s) orally 2 times a day (27 Apr 2020 15:07)  CertaVite Senior oral tablet: 1 tab(s) orally once a day (27 Apr 2020 15:07)  hydrALAZINE 25 mg oral tablet: 1 tab(s) orally 3 times a day (27 Apr 2020 15:07)  Hydrocort 2.5% topical cream: 1 application rectally once a day (at bedtime) (27 Apr 2020 15:07)  polyethylene glycol 3350 oral powder for reconstitution: 17 gram(s) orally once a day in 8 ounces of water (27 Apr 2020 15:07)  Prosight oral tablet: 1 tab(s) orally once a day (27 Apr 2020 15:07)    MEDICATIONS  (STANDING):    MEDICATIONS  (PRN):      REVIEW OF SYSTEMS:  General: no distress  Respiratory: No cough, SOB  Cardiovascular: No CP or Palpitations	  Gastrointestinal: No nausea, Vomiting. No diarrhea  Genitourinary: No urinary complaints	  Musculoskeletal: No new rash or lesions		  all other systems negative    T(F): 97.5 (04-27-20 @ 13:58), Max: 97.5 (04-27-20 @ 13:58)  HR: 101 (04-27-20 @ 13:58) (101 - 101)  BP: 95/50 (04-27-20 @ 13:58) (95/50 - 95/50)  RR: 20 (04-27-20 @ 13:58) (20 - 20)  SpO2: 97% (04-27-20 @ 13:58) (97% - 97%)  Wt(kg): --    PHYSICAL EXAM:  General: weak  Respiratory: b/l air entry  Cardiovascular: S1 S2  Gastrointestinal: soft  Extremities: no edema        04-27    138  |  100  |  64<H>  ----------------------------<  246<H>  3.6   |  22  |  4.21<H>    Ca    8.6      27 Apr 2020 15:26    TPro  8.7<H>  /  Alb  3.2<L>  /  TBili  0.4  /  DBili  x   /  AST  102<H>  /  ALT  49  /  AlkPhos  159<H>  04-27                          9.2    1.78  )-----------( 182      ( 27 Apr 2020 15:26 )             29.0       Hematocrit: 29.0 % (04-27 @ 15:26)  Hemoglobin: 9.2 g/dL (04-27 @ 15:26)  Potassium, Serum: 3.6 mmol/L (04-27 @ 15:26)  Blood Urea Nitrogen, Serum: 64 mg/dL (04-27 @ 15:26)      Creatinine, Serum: 4.21 (04-27 @ 15:26)        LIVER FUNCTIONS - ( 27 Apr 2020 15:26 )  Alb: 3.2 g/dL / Pro: 8.7 g/dL / ALK PHOS: 159 U/L / ALT: 49 U/L DA / AST: 102 U/L / GGT: x           CARDIAC MARKERS ( 27 Apr 2020 15:26 )  1.815 ng/mL / x     / x     / x     / x          < from: Xray Chest 1 View-PORTABLE IMMEDIATE (04.27.20 @ 16:12) >    EXAM:  XR CHEST PORTABLE IMMED 1V                                  PROCEDURE DATE:  04/27/2020          INTERPRETATION:  INDICATION: Shortness of Breath, Cough,  Fever    PRIORS: None    VIEWS: Portable AP radiography of the chest performed.    FINDINGS: Heart size appears within normal limits. No hilar or superior mediastinal abnormalities are identified. Patchy bilateral lung parenchymal airspace opacities are identified. No pleural effusion or pneumothorax is demonstrated. No mediastinal shift is noted. The visualized osseous structures appear unremarkable.    IMPRESSION: Findings suggestive for bilateral viral pneumonia. Clinical correlation is suggested.        DISCRETE X-RAY DATA:  Percent of LEFT lung opacification: 1-33%  Percent ofRIGHT lung opacification: 1-33%  Change in lung opacification from most recent x-ray (<=3 days): No Prior  Change from prior dated 3 or more days (same admission): No Prior            NIYAH SETTLE   This document has been electronically signed. Apr 27 2020  4:15PM                < end of copied text >

## 2020-04-27 NOTE — ED PROVIDER NOTE - OBJECTIVE STATEMENT
80 y/o female with a PMHx of dementia, presents to the ED for AMS. Pt was sent from [facility]. Pt was refusing to eat, drink, or take medication. Denies any sx when asked. Can not elaborate on sx due to dementia. PCP: Dr. Headley. No other complaints at this time.

## 2020-04-27 NOTE — H&P ADULT - ATTENDING COMMENTS
I have discussed care plan with patient and HCP,expressed understanding of problems treatment and their effect and side effects, alternatives in detail,I have asked if they have any questions and concerns and appropriately addressed them to best of my ability  Reviewed all diagonostic tests, lab results and drug drug interactions, and medications

## 2020-04-27 NOTE — ED PROVIDER NOTE - PHYSICAL EXAMINATION
GENERAL:  well appearing, non-toxic female in no acute distress  SKIN: skin warm, pink and dry. MMM. no rash. no pallor or diaphoresis. cap refill < 2 sec   ENT:  Airway intact. Patent oropharynx. Uvula midline. TMs clear b/l.   NECK: Neck supple. no meningismus.   respiratory: normal respiratory effort on 6L NC. No respiratory distress. No wheezes, stridor, rales or rhonchi. No retractions  CV: RRR, no M/R/G.   ABD: Soft, non-tender, non-distended  MSK: moving all extremities. No edema, erythema, cyanosis. Distal pulses intact.  NEURO: A+O to person. Following commands appropriately, limited historian.   PSYCH: appropriate behavior, cooperative.

## 2020-04-27 NOTE — ED PROVIDER NOTE - CLINICAL SUMMARY MEDICAL DECISION MAKING FREE TEXT BOX
Patient transferred from [place] ED for admission for AMS, COVID+, renal failure, hypoxia, elevated troponin.

## 2020-04-27 NOTE — ED PROVIDER NOTE - CARE PLAN
Principal Discharge DX:	COVID-19 virus infection  Secondary Diagnosis:	Renal failure  Secondary Diagnosis:	Hypoxia  Secondary Diagnosis:	ACS (acute coronary syndrome)

## 2020-04-27 NOTE — ED PROVIDER NOTE - PROGRESS NOTE DETAILS
spoke with Dr. Rankin regarding patient. will plan on admitting and seeing patient at [place]. will need to be transferred to [place]. will have ED attending at [place] call Dr. Rankin when patient arrives at [place]. Dr. Patel (nephrology) aware of patient and will see patient at [place]. Dr. Anton made aware of patient and will see patient in hospital spoke with Dr. Rankin regarding patient. will plan on admitting and seeing patient at [place]. will need to be transferred to [place]. will have ED attending at [place] call Dr. Rankin when patient arrives at [place]. Dr. Rodriguez (nephrology) aware of patient and will see patient at [place]. Dr. Anton made aware of patient and will see patient in hospital

## 2020-04-27 NOTE — CONSULT NOTE ADULT - ASSESSMENT
1.	KYLIE: Prerenal azotemia, On ACEI  2.	COVID Pneumonia  3.	Hypertension  4.	h/o Dementia    Continue IV hydration. Treatment for COVID pneumonia. COVID precautions. Supportive care.   Hold ACEI. Monitor BP trend. Titrate BP meds as needed. Salt restriction.   Avoid nephrotoxic meds as possible. Avoid ACEI, ARB, NSAIDs and IV contrast. Will follow electrolytes and renal function trend.   Further recommendations pending clinical course. Thank you for the courtesy of this referral.

## 2020-04-27 NOTE — ED ADULT NURSE NOTE - OBJECTIVE STATEMENT
80 y/o female received awake, alert, oriented x2 sent from nursing home for eval. pt currently denies any pain/complaints. IVL started, bloods drawn and sent to lab.

## 2020-04-27 NOTE — ED PROVIDER NOTE - ATTENDING CONTRIBUTION TO CARE
78 y/o F sent in from NH sent in fro generalized weakness.  transfer fro admission for : KYLIE, covid, hypoxia, ACS.  pt on chemo 3/6 for ovarian CA with ureteral stent.  add on UA, urine cx, Dr. Rankin made aware.

## 2020-04-27 NOTE — ED ADULT NURSE NOTE - INTERVENTIONS DEFINITIONS
Stretcher in lowest position, wheels locked, appropriate side rails in place/Monitor gait and stability/Non-slip footwear when patient is off stretcher/Monitor for mental status changes and reorient to person, place, and time/[place] to call system/Call bell, personal items and telephone within reach/Instruct patient to call for assistance/Reinforce activity limits and safety measures with patient and family/Physically safe environment: no spills, clutter or unnecessary equipment/Provide visual clues: red socks

## 2020-04-27 NOTE — H&P ADULT - HISTORY OF PRESENT ILLNESS
80 yo female with h/o dementia, HTN, ovarian cancer (on chemo just finished 3/6 round on 3/21), currently has right renal stent from [facility] transferred from [employer] [facility] ED for admission for + COVID, renal failure, hypoxia, elevated troponin. As per nursing home patient refusing to eat/drink/take her meds. Patient with Cr 4.2, trop 1.815, + COVID, cxr with bilateral opacities. At [employer] [facility] patient was initially febrile, fever resolved after tylenol. Patient limited historian. Patient evaluated  by renal Dr. Rodriguez and cards Dr. Giang   patient's daughter, Helen [phone], patient is full code. Daughter explains patient is currently getting chemo therapy for ovarian cancer oncologist Dr. Carmen Bauer [phone]. Patient has right renal stent as per daughter. Requests UA and culture at this time.

## 2020-04-27 NOTE — ED ADULT TRIAGE NOTE - ARRIVAL INFO ADDITIONAL COMMENTS
patient has an IV #20 on the left antecubital inserted at [facility], IV normal saline bag on going

## 2020-04-27 NOTE — ED PROVIDER NOTE - PROGRESS NOTE DETAILS
Spoke with Dr. Dawson, requesting to speak to family to confirm code status. Spoke with patient's daughter, Helen [phone], patient is full code. Daughter explains patient is currently getting chemo therapy for ovarian cancer oncologist Dr. Carmen Bauer [phone]. Spoke with Dr. Dawson, requesting to speak to family to confirm code status. Spoke with patient's daughter, Helen [phone], patient is full code. Daughter explains patient is currently getting chemo therapy for ovarian cancer oncologist Dr. Carmen Bauer [phone]. Patient has right renal stent as per daughter. Spoke with Dr. Rankin again after speaking to daughter, updated with new information. Requests UA and culture at this time.

## 2020-04-27 NOTE — ED ADULT NURSE NOTE - OBJECTIVE STATEMENT
Pt to ED transferred fro [facility] for further treatment. Pt is Covid-19+, c/o weakness, denies SOB, denies pain or discomfort. Pt is a/o x 4. Resps nonlabored, noted with lethargy

## 2020-04-27 NOTE — H&P ADULT - ASSESSMENT
80 yo female with h/o dementia, HTN, ovarian cancer (on chemo just finished 3/6 round on 3/21), currently has right renal stent from [facility] transferred from [place] ED for admission for + COVID, renal failure, hypoxia, elevated troponin. As per nursing home patient refusing to eat/drink/take her meds. Patient with Cr 4.2, trop 1.815, + COVID, cxr with bilateral opacities. At [place] patient was initially febrile, fever resolved after tylenol. Patient limited historian. Patient evaluated  by renal Dr. Rodriguez and cards Dr. Giang   patient's daughter, Helen [phone], patient is full code. Daughter explains patient is currently getting chemo therapy for ovarian cancer oncologist Dr. Carmen Bauer [phone]. Patient has right renal stent as per daughter. Requests UA and culture at this time.  Admitted with AMs likely metabolic encephalopathy due to sepsis de to COVId 19 infection with pneumonia with ac resp failure with hypoxia, with ckd with renal stent with kev with dehydration with fever, anemia with ac illness with leucopenia elevated troponin likely with viral myocarditis with ACS,  started on supportive care, cardio , nephro pulmonary and ID consult called

## 2020-04-27 NOTE — CONSULT NOTE ADULT - ASSESSMENT
The patient is an 79 year old female with a history of HTN, dementia who presents with AMS, hypoxia, hypotension.    Plan:  - ECG pending  - Check one set of cardiac enzymes  - Check BNP  - Check CXR  - Check COVID-19. Multiple sick contacts at [facility].  - Receiving 1 L NS. Continue IV fluids. The patient is an 79 year old female with a history of HTN, dementia who presents with AMS, hypoxia, hypotension.    Plan:  - ECG pending  - Check one set of cardiac enzymes  - Check BNP  - Check CXR  - Check COVID-19. Multiple sick contacts at [facility].  - Receiving 1 L NS. Continue IV fluids.  - Hypoxic when O2 turned off. Continue.  - Will likely be transferred to [facility] for inpatient care

## 2020-04-28 NOTE — PROGRESS NOTE ADULT - ASSESSMENT
The patient is an 79 year old female with a history of HTN, dementia who presents with AMS, hypoxia, hypotension.    Plan:  - Check one set of cardiac enzymes  - Check BNP  - Check CXR  - Check COVID-19. Multiple sick contacts at [facility].  - Receiving 1 L NS. Continue IV fluids.  - Hypoxic when O2 turned off. Continue.  - Will likely be transferred to [facility] for inpatient care The patient is an 79 year old female with a history of HTN, dementia who presents with AMS, hypoxia, hypotension.    Plan:  - Troponin elevated at 1.8 in the setting of hypoxia, KYLIE. Check second set.  - BNP mildly elevated at 2700  - Check echocardiogram  - COVID-19 positive  - KYLIE worsening. Continue IV fluids.  - Renal eval  - On ceftriaxone  - Change to heparin drip given elevated d-dimer  - Saturating mid 90s on NRB  - Pulm follow-up  - Overall prognosis poor

## 2020-04-28 NOTE — CONSULT NOTE ADULT - SUBJECTIVE AND OBJECTIVE BOX
Date/Time Patient Seen:  		  Referring MD:   Data Reviewed	       Patient is a 79y old  Female who presents with a chief complaint of AMS (27 Apr 2020 21:58)      Subjective/HPI    in bed  seen and examined  vs and meds reviewed  labs reviewed  H and P reviewed  ER provider note reviewed  poor historian   overnight events noted    80 yo female with h/o dementia, HTN, ovarian cancer (on chemo just finished 3/6 round on 3/21), currently has right renal stent from [facility] transferred from [place] ED for admission for + COVID, renal failure, hypoxia, elevated troponin. As per nursing home patient refusing to eat/drink/take her meds. Patient with Cr 4.2, trop 1.815, + COVID, cxr with bilateral opacities. At [place] patient was initially febrile, fever resolved after tylenol. Patient limited historian. Patient evaluated  by renal Dr. Rodriguez and cards Dr. Giang   patient's daughter, Helen [phone], patient is full code. Daughter explains patient is currently getting chemo therapy for ovarian cancer oncologist Dr. Carmen Bauer [phone]. Patient has right renal stent as per daughter. Requests UA and culture at this time.    non smoker  non drinker  has a daughter  lives in [facility]  family hx - ashd -      PAST MEDICAL & SURGICAL HISTORY:  Uterine cancer  Hypertension        Medication list         MEDICATIONS  (STANDING):  cefTRIAXone   IVPB 1000 milliGRAM(s) IV Intermittent every 24 hours  heparin   Injectable 5000 Unit(s) SubCutaneous every 8 hours  lactobacillus acidophilus 1 Tablet(s) Oral two times a day  sodium chloride 0.9%. 1000 milliLiter(s) (100 mL/Hr) IV Continuous <Continuous>    MEDICATIONS  (PRN):  acetaminophen  Suppository .. 650 milliGRAM(s) Rectal every 6 hours PRN Temp greater or equal to 38C (100.4F), Mild Pain (1 - 3)         Vitals log        ICU Vital Signs Last 24 Hrs  T(C): 36.7 (28 Apr 2020 05:54), Max: 38.6 (28 Apr 2020 03:30)  T(F): 98 (28 Apr 2020 05:54), Max: 101.5 (28 Apr 2020 03:30)  HR: 84 (28 Apr 2020 05:54) (76 - 98)  BP: 102/63 (28 Apr 2020 05:54) (95/59 - 118/60)  BP(mean): --  ABP: --  ABP(mean): --  RR: 19 (28 Apr 2020 05:54) (19 - 24)  SpO2: 95% (28 Apr 2020 05:54) (84% - 100%)           Input and Output:  I&O's Detail      Lab Data                        7.8    x     )-----------( 144      ( 28 Apr 2020 07:35 )             24.1     04-28    x   |  x   |  74<H>  ----------------------------<  165<H>  x    |  18<L>  |  5.30<H>      TPro  x   /  Alb  2.7<L>  /  TBili  x   /  DBili  x   /  AST  107<H>  /  ALT  37  /  AlkPhos  x   04-28            Review of Systems	  confused      Objective     Physical Examination    heart s1s2  lung dec BS  abd soft  head nc  on o2 support  frail  weak  verbal      Pertinent Lab findings & Imaging      Dustin:  NO   Adequate UO     I&O's Detail           Discussed with:     Cultures:	        Radiology  cxr   bilateral airspace disease

## 2020-04-28 NOTE — CHART NOTE - NSCHARTNOTEFT_GEN_A_CORE
Called by RN for patient with desaturating to 80's on 5-6 L nasal cannula and titrated up to 50% venti mask without improvement. Patient will be placed on lateral recumbent and started on 100% NRB. Patient remains full code. Low threshold for intubation. RN to call for any changes.

## 2020-04-28 NOTE — CONSULT NOTE ADULT - PROBLEM SELECTOR RECOMMENDATION 9
covid - dementia mild - covid pna - viral pna - ckd - kev - anemia -   ovarian ca - on chemo - pancytopenia - likely due to chemo - last chemo in March, 2020  IVF  I and O  serial renal indices  renal eval  neuro and cardio assessment -   monitor VS and HD and Sat  supportive care for covid - will consider systemic steroids if pt is Hypoxemic  on emp Rocephin for poss LRTI - will monitor clinical picture and Biomarkers  prognosis guarded  GOC discussion and pall care eval

## 2020-04-28 NOTE — PROGRESS NOTE ADULT - SUBJECTIVE AND OBJECTIVE BOX
Chief Complaint: AMS, hypoxia    Interval Events: No events overnight.    Review of Systems:  General: No fevers, chills, weight loss or gain  Skin: No rashes, color changes  Cardiovascular: No chest pain, orthopnea  Respiratory: No shortness of breath, cough  Gastrointestinal: No nausea, abdominal pain  Genitourinary: No incontinence, pain with urination  Musculoskeletal: No pain, swelling, decreased range of motion  Neurological: No headache, weakness  Psychiatric: No depression, anxiety  Endocrine: No weight loss or gain, increased thirst  All other systems are comprehensively negative.    Physical Exam:  Vitals:        Vital Signs Last 24 Hrs  T(C): 36.7 (28 Apr 2020 08:11), Max: 38.6 (28 Apr 2020 03:30)  T(F): 98.1 (28 Apr 2020 08:11), Max: 101.5 (28 Apr 2020 03:30)  HR: 86 (28 Apr 2020 08:11) (76 - 98)  BP: 112/63 (28 Apr 2020 08:11) (95/59 - 118/60)  BP(mean): --  RR: 20 (28 Apr 2020 08:11) (19 - 24)  SpO2: 95% (28 Apr 2020 08:11) (84% - 100%)  General: NAD  HEENT: MMM  Neck: No JVD, no carotid bruit  Lungs: CTAB  CV: RRR, nl S1/S2, no M/R/G  Abdomen: S/NT/ND, +BS  Extremities: No LE edema, no cyanosis  Neuro: Non-focal  Skin: No rash    Labs:                        7.8    x     )-----------( 144      ( 28 Apr 2020 07:35 )             24.1     04-28    142  |  109<H>  |  74<H>  ----------------------------<  165<H>  3.7   |  18<L>  |  5.30<H>    Ca    8.1<L>      28 Apr 2020 07:35    TPro  7.6  /  Alb  2.7<L>  /  TBili  0.4  /  DBili  x   /  AST  107<H>  /  ALT  37  /  AlkPhos  142<H>  04-28            Telemetry: Chief Complaint: AMS, hypoxia    Interval Events: No events overnight.    Review of Systems:  General: No fevers, chills, weight loss or gain  Skin: No rashes, color changes  Cardiovascular: No chest pain, orthopnea  Respiratory: No shortness of breath, cough  Gastrointestinal: No nausea, abdominal pain  Genitourinary: No incontinence, pain with urination  Musculoskeletal: No pain, swelling, decreased range of motion  Neurological: No headache, weakness  Psychiatric: No depression, anxiety  Endocrine: No weight loss or gain, increased thirst  All other systems are comprehensively negative.    Physical Exam:  Vitals:        Vital Signs Last 24 Hrs  T(C): 36.7 (28 Apr 2020 08:11), Max: 38.6 (28 Apr 2020 03:30)  T(F): 98.1 (28 Apr 2020 08:11), Max: 101.5 (28 Apr 2020 03:30)  HR: 86 (28 Apr 2020 08:11) (76 - 98)  BP: 112/63 (28 Apr 2020 08:11) (95/59 - 118/60)  BP(mean): --  RR: 20 (28 Apr 2020 08:11) (19 - 24)  SpO2: 95% (28 Apr 2020 08:11) (84% - 100%)  General: NAD  HEENT: MMM  Neck: No JVD, no carotid bruit  Lungs: CTAB  CV: RRR, nl S1/S2, no M/R/G  Abdomen: S/NT/ND, +BS  Extremities: No LE edema, no cyanosis  Neuro: Non-focal  Skin: No rash    Labs:                        7.8    x     )-----------( 144      ( 28 Apr 2020 07:35 )             24.1     04-28    142  |  109<H>  |  74<H>  ----------------------------<  165<H>  3.7   |  18<L>  |  5.30<H>    Ca    8.1<L>      28 Apr 2020 07:35    TPro  7.6  /  Alb  2.7<L>  /  TBili  0.4  /  DBili  x   /  AST  107<H>  /  ALT  37  /  AlkPhos  142<H>  04-28            Telemetry: Sinus rhythm

## 2020-04-28 NOTE — PROGRESS NOTE ADULT - ASSESSMENT
78 yo female with h/o dementia, HTN, ovarian cancer (on chemo just finished 3/6 round on 3/21), currently has right renal stent from [facility] transferred from [place] [facility] for admission for + COVID, renal failure, hypoxia, elevated troponin. As per nursing home patient refusing to eat/drink/take her meds. Patient with Cr 4.2, trop 1.815, + COVID, cxr with bilateral opacities. At [place] patient was initially febrile, fever resolved after tylenol. Patient limited historian. Patient evaluated  by renal Dr. Rodriguez and cards Dr. Giang   patient's daughter, Helen [phone], patient is full code. Daughter explains patient is currently getting chemo therapy for ovarian cancer oncologist Dr. Carmen Bauer [phone]. Patient has right renal stent as per daughter. Requests UA and culture at this time.  Admitted with AMs multifactorial, dementia and  likely metabolic encephalopathy due to sepsis de to COVId 19 infection with pneumonia with ac resp failure with hypoxia, with ckd with renal stent with kev with dehydration with fever, anemia with ac illness with pan cytopenia   multifactorial, post chemo for ov cancer and viral illness elevated troponin likely with viral myocarditis with ACS,  started on supportive care, cardio , nephro pulmonary and ID consult noted,  all biomarkers worsening,poor swallowing.d dimer increased in level, started on heparin infusion as per cardiologist.  d/w daughter , pt is full code,prognosis guarded 80 yo female with h/o dementia, HTN, ovarian cancer (on chemo just finished 3/6 round on 3/21), currently has right renal stent from [facility] transferred from [place] [facility] for admission for + COVID, renal failure, hypoxia, elevated troponin. As per nursing home patient refusing to eat/drink/take her meds. Patient with Cr 4.2, trop 1.815, + COVID, cxr with bilateral opacities. At [place] patient was initially febrile, fever resolved after tylenol. Patient limited historian. Patient evaluated  by renal Dr. Rodriguez and cards Dr. Giang   patient's daughter, Helen [phone], patient is full code. Daughter explains patient is currently getting chemo therapy for ovarian cancer oncologist Dr. Carmen Bauer [phone]. Patient has right renal stent as per daughter. Requests UA and culture at this time.  Admitted with AMs multifactorial, dementia and  likely metabolic encephalopathy due to sepsis de to COVId 19 infection with pneumonia with ac resp failure with hypoxia, with renal stent with kev with dehydration with fever, anemia with ac illness with pan cytopenia   multifactorial, post chemo for ov cancer and viral illness elevated troponin likely with viral myocarditis with ACS,h/o rt sided hydronephrosis due to rt ureter obstruction with rt ov mass cancerous.  base line creatinine- 1.1 from april 21-blood urea 17,h/o possible fall.  started on supportive care, cardio , nephro pulmonary and ID consult noted,  all biomarkers worsening,poor swallowing.d dimer increased in level, started on heparin infusion as per cardiologist.  d/w daughter , pt is full code,prognosis guarded

## 2020-04-28 NOTE — CONSULT NOTE ADULT - SUBJECTIVE AND OBJECTIVE BOX
HPI:  80 yo female with h/o dementia, HTN, ovarian cancer (on chemo completed 3/6 round on 3/21), h/o obstructive uropathy with right ureteral stent resident of [facility] admitted with + COVID 19, KYLIE, hypoxia, elevated troponin and poor po intake. Cr 4.2, trop 1.815, + COVID, CXR with bilateral opacities. Febrile Tmax 103. No n/v/d. Pt is a poor historian. On NRB.    Infectious Disease consult was called to evaluate pt.      Past Medical & Surgical Hx:  PAST MEDICAL & SURGICAL HISTORY:  Uterine cancer  Hypertension  Dementia  No significant past surgical history      Social History--  EtOH: denies   Tobacco: denies   Drug Use: denies     FAMILY HISTORY:  Noncontributory    Allergies  No Known Allergies    Intolerances  NONE    Home Medications:  acetaminophen 500 mg oral tablet: 1 tab(s) orally 3 times a day (2020 10:56)  acetaminophen 500 mg oral tablet: 1 tab(s) orally 3 times a day (2020 15:07)  Acidophilus oral tablet: 1 tab(s) orally once a day (2020 15:07)  Acidophilus oral tablet: 1 tab(s) orally once a day (2020 10:56)  amLODIPine 10 mg oral tablet: 1 tab(s) orally once a day (2020 10:56)  amLODIPine 10 mg oral tablet: 1 tab(s) orally once a day (2020 15:07)  captopril 25 mg oral tablet: 1 tab(s) orally 2 times a day (2020 15:07)  captopril 25 mg oral tablet: 1 tab(s) orally 2 times a day (2020 10:56)  certavite: 1 tab(s) orally every other day (2020 10:56)  CertaVite Senior oral tablet: 1 tab(s) orally once a day (2020 15:07)  hydrALAZINE 25 mg oral tablet: 1 tab(s) orally 3 times a day (2020 15:07)  hydrALAZINE 25 mg oral tablet: 1 tab(s) orally 3 times a day (2020 10:56)  Hydrocort 2.5% topical cream: 1 application rectally once a day (at bedtime) (2020 15:07)  polyethylene glycol 3350 oral powder for reconstitution: 17 gram(s) orally once a day in 8 ounces of water (2020 15:07)  Prosight oral tablet: 1 tab(s) orally once a day (2020 15:07)  Prosight oral tablet: 1 tab(s) orally once a day (2020 10:56)      Current Inpatient Medications :    ANTIBIOTICS:   cefTRIAXone   IVPB 1000 milliGRAM(s) IV Intermittent every 24 hours      OTHER RELEVANT MEDICATIONS :  acetaminophen  Suppository .. 650 milliGRAM(s) Rectal every 6 hours PRN  heparin   Injectable 3500 Unit(s) IV Push every 6 hours PRN  heparin   Injectable 1500 Unit(s) IV Push every 6 hours PRN  heparin  Infusion.  Unit(s)/Hr IV Continuous <Continuous>  methylPREDNISolone sodium succinate Injectable 40 milliGRAM(s) IV Push daily  sodium bicarbonate 650 milliGRAM(s) Oral three times a day  sodium chloride 0.45% 1000 milliLiter(s) IV Continuous <Continuous>      ROS:  Unable to obtain due to : poor historian      I&O's Detail    2020 07:01  -  2020 16:50  --------------------------------------------------------  IN:    heparin  Infusion.: 56 mL    sodium chloride 0.45%: 300 mL    sodium chloride 0.9%: 500 mL  Total IN: 856 mL    OUT:  Total OUT: 0 mL    Total NET: 856 mL    Physical Exam:  Vital Signs Last 24 Hrs  T(C): 37.3 (2020 16:38), Max: 39.4 (2020 19:37)  T(F): 99.1 (2020 16:38), Max: 103 (2020 19:37)  HR: 99 (2020 16:38) (76 - 99)  BP: 118/66 (2020 16:38) (95/59 - 118/66)  RR: 18 (2020 16:38) (17 - 24)  SpO2: 90% (2020 16:38) (84% - 100%)    Weight (kg): 45 (04-27 @ 20:16)    General: well developed well nourished, in no acute distress  Eyes: sclera anicteric, pupils equal and reactive to light  ENMT: buccal mucosa moist, pharynx not injected  Neck: supple, trachea midline  Lungs: clear, no wheeze/rhonchi  Cardiovascular: regular rate and rhythm, S1 S2  Abdomen: soft, nontender, no organomegaly present, bowel sounds normal  Neurological:  alert and oriented x3, Cranial Nerves II-XII grossly intact  Skin:no increased ecchymosis/petechiae/purpura  Lymph Nodes: no palpable cervical/supraclavicular lymph nodes enlargements  Extremities: no cyanosis/clubbing/edema    Labs:                         8.9    1.27  )-----------( 146      ( 2020 14:22 )             26.9       142  |  110<H>  |  85<H>  ----------------------------<  199<H>  4.1   |  18<L>  |  5.70<H>    Ca    8.1<L>      2020 14:22    TPro  7.6  /  Alb  2.7<L>  /  TBili  0.4  /  DBili  x   /  AST  107<H>  /  ALT  37  /  AlkPhos  142<H>      Urinalysis Basic - ( 2020 13:17 )    Color: Yellow / Appearance: Clear / S.010 / pH: x  Gluc: x / Ketone: Trace  / Bili: Negative / Urobili: Negative   Blood: x / Protein: 100 / Nitrite: Negative   Leuk Esterase: Trace / RBC: >50 /HPF / WBC 3-5   Sq Epi: x / Non Sq Epi: Occasional / Bacteria: Occasional    RECENT CULTURES:  pending    COVID-19 PCR . (20 @ 15:25)    COVID-19 PCR: Detected: This test has been validated by [facility] to be accurate;  though it has not been FDA cleared/approved by the usual pathway  As with all laboratory test, results should be correlated with clinical  findings.  https://www.fda.gov/media/123467/download  https://www.fda.gov/media/104642/download    RADIOLOGY & ADDITIONAL STUDIES:    EXAM:  XR CHEST PORTABLE IMMED 1V                            PROCEDURE DATE:  2020        INTERPRETATION:  INDICATION: Shortness of Breath, Cough,  Fever    PRIORS: None    VIEWS: Portable AP radiography of the chest performed.    FINDINGS: Heart size appears within normal limits. No hilar or superior mediastinal abnormalities are identified. Patchy bilateral lung parenchymal airspace opacities are identified. No pleural effusion or pneumothorax is demonstrated. No mediastinal shift is noted. The visualized osseous structures appear unremarkable.    IMPRESSION: Findings suggestive for bilateral viral pneumonia. Clinical correlation is suggested.      DISCRETE X-RAY DATA:  Percent of LEFT lung opacification: 1-33%  Percent ofRIGHT lung opacification: 1-33%  Change in lung opacification from most recent x-ray (<=3 days): No Prior  Change from prior dated 3 or more days (same admission): No Prior        Assessment :   80 yo female with h/o dementia, HTN, ovarian cancer (on chemo completed 3/6 round on 3/21), h/o obstructive uropathy with right ureteral stent resident of [facility] admitted with + COVID 19, KYLIE, hypoxia, elevated troponin and poor po intake. Cr 4.2, trop 1.815, + COVID, CXR with bilateral opacities. Febrile Tmax 103. No n/v/d. Pt is a poor historian. On NRB. Leukopenia combination of COVID 19 and chemo. KYLIE. Procalcitonin noted suggestive of superimposec bacterial process.      Plan :   Change Rocephin to Zosyn  Fu cultures  Trend renal fx  Fu Renal bladder ultrasound  Trend temps and cbc  COVID-19--critical period for decompensation  (7-14 days post symptom onset), avoid aerosolizing procedures, NSAIDs   -monitor respiratory status closely ( route of 02 and saturation) and titrate when able  -isolation precautions per protocol  -avoid excessive blood draws, blood cultures and frequent CXRs  -monitor biomarkers- CBC w diff  for NLRNLR <3 low vs >5 high) Ferritin (lower risk <450 vs >850) CRP (low risk <2 and higher risk >6) and LDH, D Dimer, procalcitonin  -therapies remains investigational-- including Hydroxychloroquine  -antibiotics only if there is concern for a bacterial process  -Steroids short course ( 5 days)  --for hypoxia/ARDS /shock    D/w Dr Rankin      Continue with present regime .  Approptiate use of antibiotics and adverse effects reviewed.      I have discussed the above plan of care with patient/family in detail. They expressed understanding of the treatment plan . Risks, benefits and alternatives discussed in detail. I have asked if they have any questions or concerns and appropriately addressed them to the best of my ability .      > 45 minutes spent in direct patient care reviewing  the notes, lab data/ imaging , discussion with multidisciplinary team. All questions were addressed and answered to the best of my capacity .    Thank you for allowing me to participate in the care of your patient .      Brittany Shaver MD  Infectious Disease  [phone]

## 2020-04-28 NOTE — DIETITIAN INITIAL EVALUATION ADULT. - ADD RECOMMEND
1) When medically feasible recommend to advance diet to Low sodium, monitor pt's renal function to determine if Renal dietary restriction needs to be added, 2) Monitor PO intake, if poor recommend oral nutritional supplement, 3) While pt remains on clear liquid encouraged intake of Ensure Clear provided on tray to provide additional protein, 4) Pt's food preferences obtained, 5) Monitor pt's PO intake, weight, skin, edema, GI distress

## 2020-04-28 NOTE — PROGRESS NOTE ADULT - SUBJECTIVE AND OBJECTIVE BOX
Patient is a 79y old  Female who presents with a chief complaint of AMS (28 Apr 2020 08:55)    Patient seen in follow up for KYLIE.   Pt seen yesterday at [facility]. Pt was transfered to [facility].   On IVF.        PAST MEDICAL HISTORY:  Uterine cancer  Hypertension  Dementia    MEDICATIONS  (STANDING):  cefTRIAXone   IVPB 1000 milliGRAM(s) IV Intermittent every 24 hours  heparin  Infusion.  Unit(s)/Hr (8 mL/Hr) IV Continuous <Continuous>  lactobacillus acidophilus 1 Tablet(s) Oral two times a day  methylPREDNISolone sodium succinate Injectable 40 milliGRAM(s) IV Push daily  sodium chloride 0.9%. 1000 milliLiter(s) (100 mL/Hr) IV Continuous <Continuous>    MEDICATIONS  (PRN):  acetaminophen  Suppository .. 650 milliGRAM(s) Rectal every 6 hours PRN Temp greater or equal to 38C (100.4F), Mild Pain (1 - 3)  heparin   Injectable 3500 Unit(s) IV Push every 6 hours PRN For aPTT less than 40  heparin   Injectable 1500 Unit(s) IV Push every 6 hours PRN For aPTT between 40 - 57    T(C): 36.7 (04-28-20 @ 08:11), Max: 39.4 (04-27-20 @ 19:37)  HR: 86 (04-28-20 @ 08:11) (76 - 101)  BP: 112/63 (04-28-20 @ 08:11) (95/50 - 118/60)  RR: 20 (04-28-20 @ 08:11) (17 - 24)  SpO2: 95% (04-28-20 @ 08:11) (84% - 100%)  Wt(kg): --  I&O's Detail    28 Apr 2020 07:01  -  28 Apr 2020 12:39  --------------------------------------------------------  IN:    heparin  Infusion.: 16 mL    sodium chloride 0.9%.: 400 mL  Total IN: 416 mL    OUT:  Total OUT: 0 mL    Total NET: 416 mL          PHYSICAL EXAM:  Pt on COVID precautions. Chart reviewed and previous physical examination noted.                             7.8    1.20  )-----------( 144      ( 28 Apr 2020 07:35 )             24.1     04-28    142  |  109<H>  |  74<H>  ----------------------------<  165<H>  3.7   |  18<L>  |  5.30<H>    Ca    8.1<L>      28 Apr 2020 07:35    TPro  7.6  /  Alb  2.7<L>  /  TBili  0.4  /  DBili  x   /  AST  107<H>  /  ALT  37  /  AlkPhos  142<H>  04-28    CARDIAC MARKERS ( 28 Apr 2020 10:22 )  2.200 ng/mL / x     / x     / x     / x      CARDIAC MARKERS ( 27 Apr 2020 15:26 )  1.815 ng/mL / x     / x     / x     / x          LIVER FUNCTIONS - ( 28 Apr 2020 07:35 )  Alb: 2.7 g/dL / Pro: 7.6 g/dL / ALK PHOS: 142 U/L / ALT: 37 U/L / AST: 107 U/L / GGT: x               Sodium, Serum: 142 (04-28 @ 07:35)  Sodium, Serum: 138 (04-27 @ 15:26)    Creatinine, Serum: 5.30 (04-28 @ 07:35)  Creatinine, Serum: 4.21 (04-27 @ 15:26)    Potassium, Serum: 3.7 (04-28 @ 07:35)  Potassium, Serum: 3.6 (04-27 @ 15:26)    Hemoglobin: 7.8 (04-28 @ 07:35)  Hemoglobin: 9.2 (04-27 @ 15:26)

## 2020-04-28 NOTE — CONSULT NOTE ADULT - SUBJECTIVE AND OBJECTIVE BOX
Altered in mental status,   For Altered in mental status and lethargy, suspect most likely this is metabolic encephalopathy, which is multifactorial secondary to underlying infection type process along with a slight increase in renal function ,  For history of sepsis, antibiotics as needed.  monitor renal function  spoke to family in detail

## 2020-04-28 NOTE — PROGRESS NOTE ADULT - SUBJECTIVE AND OBJECTIVE BOX
Patient is a 79y old  Female who presents with a chief complaint of AMS (28 Apr 2020 08:55)      INTERVAL HPI/OVERNIGHT EVENTS:0vernight events noted    Home Medications:  acetaminophen 500 mg oral tablet: 1 tab(s) orally 3 times a day (28 Apr 2020 10:56)  acetaminophen 500 mg oral tablet: 1 tab(s) orally 3 times a day (27 Apr 2020 15:07)  Acidophilus oral tablet: 1 tab(s) orally once a day (27 Apr 2020 15:07)  Acidophilus oral tablet: 1 tab(s) orally once a day (28 Apr 2020 10:56)  amLODIPine 10 mg oral tablet: 1 tab(s) orally once a day (28 Apr 2020 10:56)  amLODIPine 10 mg oral tablet: 1 tab(s) orally once a day (27 Apr 2020 15:07)  captopril 25 mg oral tablet: 1 tab(s) orally 2 times a day (27 Apr 2020 15:07)  captopril 25 mg oral tablet: 1 tab(s) orally 2 times a day (28 Apr 2020 10:56)  certavite: 1 tab(s) orally every other day (28 Apr 2020 10:56)  CertaVite Senior oral tablet: 1 tab(s) orally once a day (27 Apr 2020 15:07)  hydrALAZINE 25 mg oral tablet: 1 tab(s) orally 3 times a day (27 Apr 2020 15:07)  hydrALAZINE 25 mg oral tablet: 1 tab(s) orally 3 times a day (28 Apr 2020 10:56)  Hydrocort 2.5% topical cream: 1 application rectally once a day (at bedtime) (27 Apr 2020 15:07)  polyethylene glycol 3350 oral powder for reconstitution: 17 gram(s) orally once a day in 8 ounces of water (27 Apr 2020 15:07)  Prosight oral tablet: 1 tab(s) orally once a day (27 Apr 2020 15:07)  Prosight oral tablet: 1 tab(s) orally once a day (28 Apr 2020 10:56)      MEDICATIONS  (STANDING):  cefTRIAXone   IVPB 1000 milliGRAM(s) IV Intermittent every 24 hours  heparin  Infusion.  Unit(s)/Hr (8 mL/Hr) IV Continuous <Continuous>  lactobacillus acidophilus 1 Tablet(s) Oral two times a day  methylPREDNISolone sodium succinate Injectable 40 milliGRAM(s) IV Push daily  sodium bicarbonate 650 milliGRAM(s) Oral three times a day  sodium chloride 0.45%. 1000 milliLiter(s) (75 mL/Hr) IV Continuous <Continuous>    MEDICATIONS  (PRN):  acetaminophen  Suppository .. 650 milliGRAM(s) Rectal every 6 hours PRN Temp greater or equal to 38C (100.4F), Mild Pain (1 - 3)  heparin   Injectable 3500 Unit(s) IV Push every 6 hours PRN For aPTT less than 40  heparin   Injectable 1500 Unit(s) IV Push every 6 hours PRN For aPTT between 40 - 57      Allergies    No Known Allergies    Intolerances        REVIEW OF SYSTEMS:uable as confused  CONSTITUTIONAL: No fever, weight loss, or fatigue  EYES: No eye pain, visual disturbances, or discharge  ENMT:  No difficulty hearing, tinnitus, vertigo; No sinus or throat pain  NECK: No pain or stiffness  BREASTS: No pain, masses, or nipple discharge  RESPIRATORY: No cough, wheezing, chills or hemoptysis; No shortness of breath  CARDIOVASCULAR: No chest pain, palpitations, dizziness, or leg swelling  GASTROINTESTINAL: No abdominal or epigastric pain. No nausea, vomiting, or hematemesis; No diarrhea or constipation. No melena or hematochezia.  GENITOURINARY: No dysuria, frequency, hematuria, or incontinence  NEUROLOGICAL: No headaches, memory loss, loss of strength, numbness, or tremors  SKIN: No itching, burning, rashes, or lesions   LYMPH NODES: No enlarged glands  ENDOCRINE: No heat or cold intolerance; No hair loss  MUSCULOSKELETAL: No joint pain or swelling; No muscle, back, or extremity pain  PSYCHIATRIC: No depression, anxiety, mood swings, or difficulty sleeping  HEME/LYMPH: No easy bruising, or bleeding gums  ALLERGY AND IMMUNOLOGIC: No hives or eczema    Vital Signs Last 24 Hrs  T(C): 36.7 (28 Apr 2020 08:11), Max: 39.4 (27 Apr 2020 19:37)  T(F): 98.1 (28 Apr 2020 08:11), Max: 103 (27 Apr 2020 19:37)  HR: 86 (28 Apr 2020 08:11) (76 - 101)  BP: 112/63 (28 Apr 2020 08:11) (95/50 - 118/60)  BP(mean): --  RR: 20 (28 Apr 2020 08:11) (17 - 24)  SpO2: 95% (28 Apr 2020 08:11) (84% - 100%)    PHYSICAL EXAM:  GENERAL:  well-groomed, well-developed  HEAD:  Atraumatic, Normocephalic  EYES: EOMI, PERRLA, conjunctiva and sclera clear, pale  ENMT: Moist mucous membranes,   NECK: Supple, No JVD, Normal thyroid  NERVOUS SYSTEM:  Alert & non verbal  CHEST/LUNG: Clear to percussion bilaterally; No rales, rhonchi, wheezing, or rubs  HEART: Regular rate and rhythm; No murmurs, rubs, or gallops  ABDOMEN: Soft, Nontender, Nondistended; Bowel sounds present  EXTREMITIES:  2+ Peripheral Pulses, No clubbing, cyanosis, or edema  SKIN: No rashes or lesions    LABS:                        7.8    1.20  )-----------( 144      ( 28 Apr 2020 07:35 )             24.1     04-28    142  |  109<H>  |  74<H>  ----------------------------<  165<H>  3.7   |  18<L>  |  5.30<H>    Ca    8.1<L>      28 Apr 2020 07:35    TPro  7.6  /  Alb  2.7<L>  /  TBili  0.4  /  DBili  x   /  AST  107<H>  /  ALT  37  /  AlkPhos  142<H>  04-28    PT/INR - ( 27 Apr 2020 15:26 )   PT: 10.7 sec;   INR: 0.97 ratio         PTT - ( 27 Apr 2020 15:26 )  PTT:30.5 sec    CAPILLARY BLOOD GLUCOSE              I&O's Summary    28 Apr 2020 07:01  -  28 Apr 2020 12:52  --------------------------------------------------------  IN: 416 mL / OUT: 0 mL / NET: 416 mL        RADIOLOGY & ADDITIONAL TESTS:    Imaging Personally Reviewed:  [x ] YES  [ ] NO    Consultant(s) Notes Reviewed:  [x ] YES  [ ] NO    Care Discussed with Consultants/Other Providers [x ] YES  [ ] NO

## 2020-04-28 NOTE — DIETITIAN INITIAL EVALUATION ADULT. - OTHER INFO
As per chart pt is a 79 year old female with a PMH of dementia, HTN, ovarian cancer (on chemo just finished 3/6 round on 3/21), currently has right renal stent from [facility] transferred from [place] [facility] for admission for + COVID, renal failure, hypoxia, elevated troponin. As per nursing home patient refusing to eat/drink/take her meds. Pt admitted with AMS likely metabolic due to sepsis due to COVID-19. As per chart pt is a 79 year old female with a PMH of dementia, HTN, ovarian cancer (on chemo just finished 3/6 round on 3/21), currently has right renal stent from [facility] transferred from [place] [facility] for admission for + COVID, renal failure, hypoxia, elevated troponin. As per nursing home patient refusing to eat/drink/take her meds. Pt admitted with AMS likely metabolic due to sepsis due to COVID-19.    Spoke to pt's daughter and son in-law via phone. Pt normally with a good appetite and PO intake, currently on chemo however appetite was not affected until recently where at the Assisted living facility the pt was noted to be refusing to eat, drink, take meds. most likely due to viral illness. Pt is from the [place] and enjoys spicy, flavorful foods, family was brining in the pt food from home to Assisted living facility. Pt does not eat, pork, beef, or turkey, Pt's family does not believe the pt was on receiving any oral nutritional supplements PTA. NKFA.    Pt is currently on a clear liquid diet. Pt's admission weight 99.3lbs. Pt's family reports UBW of 123-125lbs, state that the pt has always been on the thin, small side. Pt's family unsure if pt has experienced any weight loss as they have to been able to see her in over a month. Pt with potential 10-15lbs weight loss, recommend to trend pt's daily body weight to obtain more accurate weight and monitor for weight loss. No chewing/ swallowing issues. No GI distress noted at this time, no BM since admission.     No edema or pressure injuries noted at this time As per chart pt is a 79 year old female with a PMH of dementia, HTN, ovarian cancer (on chemo just finished 3/6 round on 3/21), currently has right renal stent from [facility] transferred from [facility] for admission for + COVID, renal failure, hypoxia, elevated troponin. As per nursing home patient refusing to eat/drink/take her meds. Pt admitted with AMS likely metabolic due to sepsis due to COVID-19.    Spoke to pt's daughter and son in-law via phone. Pt normally with a good appetite and PO intake, currently on chemo however appetite was not affected until recently where at the Assisted living facility the pt was noted to be refusing to eat, drink, take meds. most likely due to viral illness. Pt is from the [place] and enjoys spicy, flavorful foods, family was brining in the pt food from home to Assisted living facility. Pt does not eat, pork, beef, or turkey, Pt's family does not believe the pt was on receiving any oral nutritional supplements PTA. Pt's daughter Maura ([phone]) said to call her with any additional questions about her mother. NKFA.    Pt is currently on a clear liquid diet. Pt's admission weight 99.3lbs. Pt's family reports UBW of 123-125lbs, state that the pt has always been on the thin, small side. Pt's family unsure if pt has experienced any weight loss as they have to been able to see her in over a month. Pt with potential 10-15lbs weight loss, recommend to trend pt's daily body weight to obtain more accurate weight and monitor for weight loss. No chewing/ swallowing issues. No GI distress noted at this time, no BM since admission.     No edema or pressure injuries noted at this time

## 2020-04-28 NOTE — ED ADULT NURSE REASSESSMENT NOTE - NS ED NURSE REASSESS COMMENT FT1
patient's oxygen saturation dropped to 85% on 5 litres nasal cannula, oxygen increased to 6 litres nasal cannula, oxygen saturation still at 84%, patient placed on 50% ventimask, oxygen satuiratyion oracio 84%, spoke to house arnel Mark, patient to be placed on nonrebreather mask, and placed in left lateral position.

## 2020-04-28 NOTE — PROVIDER CONTACT NOTE (OTHER) - ACTION/TREATMENT ORDERED:
put a call out to Dr Mccoy put a call out to Dr Mccoy  called back and is aware awaiting US of [facility] put a call out to them awaiting for them to take patient

## 2020-04-29 NOTE — PROGRESS NOTE ADULT - ASSESSMENT
The patient is an 79 year old female with a history of HTN, ovarian cancer, dementia who presents with AMS, hypoxia, hypotension in the setting of COVID-19, course c/b KYLIE, respiratory failure, NSTEMI.    Plan:  - Troponin trended up to 2.2, set pending this am. Likely demand ischemia from respiratory failure, KYLIE although cannot exclude NSTEMI or myocarditis.  - Check echocardiogram  - KYLIE worsening. Receiving IV fluids.  - Renal follow-up  - On zosyn  - Continue heparin drip for treatment of NSTEMI and possible VTE in the setting of COVID  - Pancytopenic - trend hemoglobin as patient is on a heparin drip. If trends down significantly, may need to stop anticoagulation.  - On propofol  - On IV steroids  - Mechanical ventilation  - ICU care  - Overall prognosis very poor

## 2020-04-29 NOTE — CHART NOTE - NSCHARTNOTEFT_GEN_A_CORE
Called by radiologist regarding ETT in R. main stem, ETT pulled back to at 17 at the lip, repeat CXR ordered and reviewed, pending official radiology read

## 2020-04-29 NOTE — GOALS OF CARE CONVERSATION - ADVANCED CARE PLANNING - CONVERSATION DETAILS
Spoke with pts dtr /HCP she states her mother wants all done FULL CODE.
Called daughterViolette (HCP) as patient was noted to be hypoxic, 70's while prone and on 100% NRB. Discussed that patient's respiratory status is tenuous and is high risk for intubation. Discussed that intubation and compressions are aggressive measures and patient's quality of life would unlikely improve with these invasive measures in the setting of COVID 19, advanced disease and comorbidities. Daughter states that patient would have wanted full code and wants to live. Offered comfort measures as patient is dyspneic and uncomfortable. Family declined at this time. Pt remains full code.

## 2020-04-29 NOTE — PROGRESS NOTE ADULT - SUBJECTIVE AND OBJECTIVE BOX
Chief Complaint: AMS, hypoxia    Interval Events: Hypoxic overnight requiring intubation.    Review of Systems:  Unable to obtain    Physical Exam:  Vital Signs Last 24 Hrs  T(C): 36.5 (29 Apr 2020 03:50), Max: 37.3 (28 Apr 2020 16:38)  T(F): 97.7 (29 Apr 2020 03:50), Max: 99.1 (28 Apr 2020 16:38)  HR: 112 (29 Apr 2020 06:13) (86 - 116)  BP: 140/83 (29 Apr 2020 03:50) (112/63 - 140/83)  BP(mean): --  RR: 25 (29 Apr 2020 03:50) (18 - 25)  SpO2: 100% (29 Apr 2020 06:13) (71% - 100%)  General: Sedated  HEENT: Intubated  Neck: No JVD, no carotid bruit  Lungs: Coarse bilaterally  CV: RRR, nl S1/S2, no M/R/G  Abdomen: S/NT/ND, +BS  Extremities: No LE edema, no cyanosis  Neuro: Non-focal  Skin: No rash    Labs:                        7.8    x     )-----------( 144      ( 28 Apr 2020 07:35 )             24.1     04-28    142  |  109<H>  |  74<H>  ----------------------------<  165<H>  3.7   |  18<L>  |  5.30<H>    Ca    8.1<L>      28 Apr 2020 07:35    TPro  7.6  /  Alb  2.7<L>  /  TBili  0.4  /  DBili  x   /  AST  107<H>  /  ALT  37  /  AlkPhos  142<H>  04-28            Telemetry: Sinus rhythm, PVCs, NSVT

## 2020-04-29 NOTE — PROGRESS NOTE ADULT - ASSESSMENT
80 yo female with h/o dementia, HTN, ovarian cancer (on chemo completed 3/6 round on 3/21), h/o obstructive uropathy with right ureteral stent resident of [facility] admitted with + COVID 19, KYLIE, hypoxia, elevated troponin and poor po intake.   COVID-19---high risk period for decompensation  (7-14 days post symptom onset), critical to determine date of symptom onset,  avoid aerosolizing procedures, avoid excessive blood draws so limit lab testing and draws - do recommend for hospitalized patients  -For all patients: daily D-dimer, BMP, CBC w diff, D-dimer, [CBC w diff to follow NLR (NLR <3 low vs >5 high)  D-dimer (<1,000 vs >1,000)]-other labs at admission to risk stratify and predict clinical course, (would repeat on day of decompensation)  Procalcitonin (>0.2 associated with more severe disease),  Ferritin (lower risk <450 vs >850),  CRP (low risk <2 and higher risk >6)  and if prognosis is unclear or if immunomodulators being considered: LDH, ESR, PT/PTT, CK, total, CKMB mass assay, lactate, troponin, fibrinogen,, IL-6 (and other interleukins as indicated) -antibiotics only if there is concern for a bacterial process,  consider methylprednisolone (SOLU-medrol) 1mg/kg/day IV x 5 days at onset of increased oxygen requirement (potential to finish w PO or extend to 7 days and in some cases an oral taper) and IL6 inhibition during this critical window but with consideration of impact on risk of infectious complications  (consider proning and tolerating lower oxygen saturation to avoid intubation).   --DVT prophylaxis broadly recommended and with very high incidence of PEs patients sick enough to require steroids or TOCI may benefit from full dose anticoagulation with lovenox 1mg/kg subcut BID (renally adjusted as needed to QD) and would track daily D-dimers, PT/PTT, fibrinogen, CRP, procalcitonin and platelet counts from day of increased oxygen requirement and for high risk patients requiring steroids or D-dimer >3x ULN  (>700) consider discharge on oral anticoagulation with rivaroxaban (Xarelto)  10mg PO QD or Eliquis 2.5mg PO BID  x 6 weeks in high risk patients, unclear if ASA of benefit  4/28 NLR 0.82/0.34=2.4  Changed Rocephin to Zosyn  4/29-NLR 0.63/0.72=0.9  pt now intubated in ICU -reasonable to continue abx in this context pending further data

## 2020-04-29 NOTE — PROGRESS NOTE ADULT - ASSESSMENT
80 yo female with h/o dementia, HTN, ovarian cancer, now COVID PNA and VDRF. She also has KYLIE with oligo anuria.   Basing on the overall comorbidities and hemodynamic compromise, she is not a candidate for dialysis at this time. Family spoke to Dr. Rodriguez over the telephone. Will monitor on daily basis and evaluate for further care. Prognosis is very poor. Case discussed with ICU team.

## 2020-04-29 NOTE — PROGRESS NOTE ADULT - SUBJECTIVE AND OBJECTIVE BOX
SANTOS GAYTAN  79y  Female    Patient is a 79y old  Female who presents with a chief complaint of AMS (2020 12:00)      vented and sedated  hypotensive on pressors  doing poorly  urine out put is less than 20 cc an hour.     PAST MEDICAL & SURGICAL HISTORY:  Uterine cancer  Hypertension  Dementia  No significant past surgical history          PHYSICAL EXAM:    T(C): 36.6 (20 @ 07:45), Max: 37.3 (20 @ 16:38)  HR: 76 (20 @ 11:00) (76 - 116)  BP: 84/50 (20 @ 11:00) (84/50 - 140/83)  RR: 30 (20 @ 11:00) (18 - 35)  SpO2: 92% (20 @ 11:00) (71% - 100%)  Wt(kg): --    I&O's Detail    2020 07:01  -  2020 07:00  --------------------------------------------------------  IN:    heparin  Infusion.: 73 mL    propofol Infusion: 5 mL    sodium chloride 0.45%: 300 mL    sodium chloride 0.45%: 320 mL    sodium chloride 0.9%: 500 mL  Total IN: 1198 mL    OUT:  Total OUT: 0 mL    Total NET: 1198 mL      2020 07:01  -  2020 12:06  --------------------------------------------------------  IN:    heparin  Infusion.: 18 mL    propofol Infusion: 35.1 mL    sodium chloride 0.45%: 320 mL  Total IN: 373.1 mL    OUT:    Indwelling Catheter - Urethral: 45 mL  Total OUT: 45 mL    Total NET: 328.1 mL          Respiratory: clear anteriorly, decreased BS at bases  Cardiovascular: S1 S2  Gastrointestinal: soft NT ND +BS  Extremities: edema   Neuro: unresponsive on the vent.    MEDICATIONS  (STANDING):  argatroban Infusion 2 MICROgram(s)/kG/Min (5.4 mL/Hr) IV Continuous <Continuous>  chlorhexidine 0.12% Liquid 15 milliLiter(s) Oral Mucosa every 12 hours  famotidine Injectable 20 milliGRAM(s) IV Push every 48 hours  lactobacillus acidophilus 1 Tablet(s) Oral two times a day  methylPREDNISolone sodium succinate Injectable 40 milliGRAM(s) IV Push daily  piperacillin/tazobactam IVPB.. 3.375 Gram(s) IV Intermittent every 12 hours  propofol Infusion 30 MICROgram(s)/kG/Min (8.1 mL/Hr) IV Continuous <Continuous>  sodium chloride 0.45% 1000 milliLiter(s) (80 mL/Hr) IV Continuous <Continuous>    MEDICATIONS  (PRN):  acetaminophen  Suppository .. 650 milliGRAM(s) Rectal every 6 hours PRN Temp greater or equal to 38C (100.4F), Mild Pain (1 - 3)                            7.6    1.62  )-----------( 186      ( 2020 08:01 )             23.6           142  |  105  |  102<H>  ----------------------------<  255<H>  4.0   |  19<L>  |  6.60<H>    Ca    8.1<L>      2020 08:01  Phos  9.2       Mg     2.4         TPro  7.7  /  Alb  2.5<L>  /  TBili  0.6  /  DBili  x   /  AST  171<H>  /  ALT  48  /  AlkPhos  210<H>        Creatinine Trend: Creatinine Trend: 6.60<--, 5.70<--, 5.30<--, 4.21<--    Urinalysis Basic - ( 2020 13:17 )    Color: Yellow / Appearance: Clear / S.010 / pH: x  Gluc: x / Ketone: Trace  / Bili: Negative / Urobili: Negative   Blood: x / Protein: 100 / Nitrite: Negative   Leuk Esterase: Trace / RBC: >50 /HPF / WBC 3-5   Sq Epi: x / Non Sq Epi: Occasional / Bacteria: Occasional

## 2020-04-29 NOTE — PROGRESS NOTE ADULT - SUBJECTIVE AND OBJECTIVE BOX
Patient is a 79y old  Female who presents with a chief complaint of AMS (2020 02:35)    24 hour events: hypoxic overnight requiring intubation.    ROS: unable to obtain    T(F): 97.8 (20 @ 07:45), Max: 99.1 (20 @ 16:38)  HR: 95 (20 @ 08:42) (95 - 116)  BP: 112/63 (20 @ 06:00) (112/63 - 140/83)  RR: 28 (20 @ 06:00) (18 - 28)  SpO2: 93% (20 @ 08:42) (71% - 100%)  Wt(kg): --    Mode: AC/ CMV (Assist Control/ Continuous Mandatory Ventilation), RR (machine): 28, TV (machine): 350, FiO2: 80, PEEP: 10        I&O's Summary     @ 07:01  -   @ 07:00  --------------------------------------------------------  IN: 1107 mL / OUT: 0 mL / NET: 1107 mL      PHYSICAL EXAM  General:   CNS:   HEENT:   Resp:   CVS:   Abd:   Ext:   Skin:     MEDICATIONS  piperacillin/tazobactam IVPB.. IV Intermittent      methylPREDNISolone sodium succinate Injectable IV Push      acetaminophen  Suppository .. Rectal PRN  fentaNYL    Injectable IV Push PRN  propofol Infusion IV Continuous      heparin   Injectable IV Push PRN  heparin   Injectable IV Push PRN  heparin  Infusion. IV Continuous    famotidine Injectable IV Push      sodium chloride 0.45% IV Continuous      chlorhexidine 0.12% Liquid Oral Mucosa    lactobacillus acidophilus Oral                          7.6    1.62  )-----------( 186      ( 2020 08:01 )             23.6           142  |  105  |  102<H>  ----------------------------<  255<H>  4.0   |  19<L>  |  6.60<H>    Ca    8.1<L>      2020 08:01  Phos  9.2       Mg     2.4         TPro  7.7  /  Alb  2.5<L>  /  TBili  0.6  /  DBili  x   /  AST  171<H>  /  ALT  48  /  AlkPhos  210<H>        CARDIAC MARKERS ( 2020 08:01 )  2.030 ng/mL / x     / x     / x     / x      CARDIAC MARKERS ( 2020 10:22 )  2.200 ng/mL / x     / x     / x     / x      CARDIAC MARKERS ( 2020 15:26 )  1.815 ng/mL / x     / x     / x     / x          PT/INR - ( 2020 15:26 )   PT: 10.7 sec;   INR: 0.97 ratio         PTT - ( 2020 08:01 )  PTT:> 200 sec  Urinalysis Basic - ( 2020 13:17 )    Color: Yellow / Appearance: Clear / S.010 / pH: x  Gluc: x / Ketone: Trace  / Bili: Negative / Urobili: Negative   Blood: x / Protein: 100 / Nitrite: Negative   Leuk Esterase: Trace / RBC: >50 /HPF / WBC 3-5   Sq Epi: x / Non Sq Epi: Occasional / Bacteria: Occasional      .Blood Blood-Peripheral   No growth to date. --  @ 22:05        Radiology:   from: Xray Chest 1 View- PORTABLE-Urgent (20 @ 09:24) >  mpression:  1.  Interval readjustment of the endotracheal tube with the endotracheal tube tip approximately 8 mm above the rod.  2.  Unchanged bilateral airspace opacities when compared with 2020 at 5:22 AM. Airspace opacities have worsened when compared with 2020.      DISCRETE X-RAY DATA:  Percent of LEFT lung opacification: 34-66%  Percent of RIGHT lung opacification: 34-66%  Change in lung opacification from most recent x-ray (<=3 days): Stable  Change from prior dated 3 or more days (same admission): Increase                  ELVIE EDWARDS M.D., ATTENDING RADIOLOGIST  This document has been electronically signed. 2020  9:28AM          < end of copied text >      Bedside lung ultrasound: ***  Bedside ECHO: ***    CENTRAL LINE: Y  20  ARDON: Y 20  A-LINE: N    GLOBAL ISSUE/BEST PRACTICE  Analgesia: Y  Sedation: Y  CAM-ICU: Neg  HOB elevation: yes  Stress ulcer prophylaxis: Y  VTE prophylaxis: Y  Glycemic control: Y  Nutrition: N    CODE STATUS: Full code  [facility] discussion: Y Patient is a 79y old  Female who presents with a chief complaint of AMS (2020 02:35)    24 hour events: hypoxic overnight requiring intubation.    ROS: unable to obtain    T(F): 97.8 (20 @ 07:45), Max: 99.1 (20 @ 16:38)  HR: 95 (20 @ 08:42) (95 - 116)  BP: 112/63 (20 @ 06:00) (112/63 - 140/83)  RR: 28 (20 @ 06:00) (18 - 28)  SpO2: 93% (20 @ 08:42) (71% - 100%)  Wt(kg): --    Mode: AC/ CMV (Assist Control/ Continuous Mandatory Ventilation), RR (machine): 28, TV (machine): 350, FiO2: 80, PEEP: 10        I&O's Summary     @ 07:01  -   @ 07:00  --------------------------------------------------------  IN: 1107 mL / OUT: 0 mL / NET: 1107 mL      PHYSICAL EXAM  General: intubated, sedated  CNS: rrr, no murmur  HEENT: ET and OGT in place  Resp: diminished but equal b/l  CV: rrr, no murmur   Abd: soft, nt, nd  Ext: no edema  Skin: no rash/petechiae    MEDICATIONS  piperacillin/tazobactam IVPB.. IV Intermittent  methylPREDNISolone sodium succinate Injectable IV Push  acetaminophen  Suppository .. Rectal PRN  fentaNYL    Injectable IV Push PRN  propofol Infusion IV Continuous  heparin   Injectable IV Push PRN  heparin   Injectable IV Push PRN  heparin  Infusion. IV Continuous  famotidine Injectable IV Push  sodium chloride 0.45% IV Continuous  chlorhexidine 0.12% Liquid Oral Mucosa  lactobacillus acidophilus Oral                          7.6    1.62  )-----------( 186      ( 2020 08:01 )             23.6           142  |  105  |  102<H>  ----------------------------<  255<H>  4.0   |  19<L>  |  6.60<H>    Ca    8.1<L>      2020 08:01  Phos  9.2       Mg     2.4         TPro  7.7  /  Alb  2.5<L>  /  TBili  0.6  /  DBili  x   /  AST  171<H>  /  ALT  48  /  AlkPhos  210<H>        CARDIAC MARKERS ( 2020 08:01 )  2.030 ng/mL / x     / x     / x     / x      CARDIAC MARKERS ( 2020 10:22 )  2.200 ng/mL / x     / x     / x     / x      CARDIAC MARKERS ( 2020 15:26 )  1.815 ng/mL / x     / x     / x     / x          PT/INR - ( 2020 15:26 )   PT: 10.7 sec;   INR: 0.97 ratio         PTT - ( 2020 08:01 )  PTT:> 200 sec  Urinalysis Basic - ( 2020 13:17 )    Color: Yellow / Appearance: Clear / S.010 / pH: x  Gluc: x / Ketone: Trace  / Bili: Negative / Urobili: Negative   Blood: x / Protein: 100 / Nitrite: Negative   Leuk Esterase: Trace / RBC: >50 /HPF / WBC 3-5   Sq Epi: x / Non Sq Epi: Occasional / Bacteria: Occasional      .Blood Blood-Peripheral   No growth to date. --  @ 22:05        Radiology:   from: Xray Chest 1 View- PORTABLE-Urgent (20 @ 09:24) >  mpression:  1.  Interval readjustment of the endotracheal tube with the endotracheal tube tip approximately 8 mm above the rod.  2.  Unchanged bilateral airspace opacities when compared with 2020 at 5:22 AM. Airspace opacities have worsened when compared with 2020.      DISCRETE X-RAY DATA:  Percent of LEFT lung opacification: 34-66%  Percent of RIGHT lung opacification: 34-66%  Change in lung opacification from most recent x-ray (<=3 days): Stable  Change from prior dated 3 or more days (same admission): Increase                  ELVIE EDWARDS M.D., ATTENDING RADIOLOGIST  This document has been electronically signed. 2020  9:28AM          < end of copied text >      Bedside lung ultrasound: ***  Bedside ECHO: ***    CENTRAL LINE: Y  20  ARDON: Y 20  A-LINE: N    GLOBAL ISSUE/BEST PRACTICE  Analgesia: Y  Sedation: Y  CAM-ICU: Neg  HOB elevation: yes  Stress ulcer prophylaxis: Y  VTE prophylaxis: Y  Glycemic control: Y  Nutrition: N    CODE STATUS: Full code  GO discussion: Y

## 2020-04-29 NOTE — CHART NOTE - NSCHARTNOTEFT_GEN_A_CORE
ICU/respiratory STAT for O2 76% on NRB. Patient prone and tachypneic on arrival. O2 saturation and WOB slowly improved to 85% during RRT. ICU evaluated patient and determined no need for ICU care ot transfer at this time. Long conversation held with family and patient remains full code (see GOC note in chart). Dr. Rankin made aware of status change. Patient remains at high risk of decompensation/intubation at this time. LATE ENTRY     0230: ICU/respiratory STAT for O2 76% on NRB. Patient prone and tachypneic on arrival. O2 saturation and WOB slowly improved to 85% during RRT. ICU evaluated patient and determined no need for ICU care ot transfer at this time. Long conversation held with family and patient remains full code (see GOC note in chart). Dr. Rankin made aware of status change. Patient remains at high risk of decompensation/intubation at this time.

## 2020-04-29 NOTE — CONSULT NOTE ADULT - SUBJECTIVE AND OBJECTIVE BOX
ICU Progress Note    HPI:    S:    Pt seen and examined  HD # 3  Pt here for dementia, HTN, ovarian cancer (on chemo just finished 3/6 round on 3/21), currently has right renal stent  Pt here for failure to thrive  + COVID    RRT this morning for hypoxia     AM: Pt self proning, on NC + NRB, SpO2 initially in 70's, improving now into 80's. RRT team at bedside.    ROS: Unable to obtain 2/2 Pt condition (resp distress)    Allergies    No Known Allergies    Intolerances        MEDICATIONS  (STANDING):  heparin  Infusion.  Unit(s)/Hr (8 mL/Hr) IV Continuous <Continuous>  lactobacillus acidophilus 1 Tablet(s) Oral two times a day  methylPREDNISolone sodium succinate Injectable 40 milliGRAM(s) IV Push daily  piperacillin/tazobactam IVPB.. 3.375 Gram(s) IV Intermittent every 12 hours  sodium chloride 0.45% 1000 milliLiter(s) (80 mL/Hr) IV Continuous <Continuous>    MEDICATIONS  (PRN):  acetaminophen  Suppository .. 650 milliGRAM(s) Rectal every 6 hours PRN Temp greater or equal to 38C (100.4F), Mild Pain (1 - 3)  heparin   Injectable 3500 Unit(s) IV Push every 6 hours PRN For aPTT less than 40  heparin   Injectable 1500 Unit(s) IV Push every 6 hours PRN For aPTT between 40 - 57      Drug Dosing Weight  Height (cm): 154.94 (2020 13:58)  Weight (kg): 45 (2020 20:16)  BMI (kg/m2): 18.7 (2020 20:16)  BSA (m2): 1.4 (2020 20:16)      PAST MEDICAL & SURGICAL HISTORY:  Uterine cancer  Hypertension  Dementia  No significant past surgical history      FAMILY HISTORY:          ROS: See HPI; otherwise, all systems reviewed and negative.    O:    ICU Vital Signs Last 24 Hrs  T(C): 37.3 (2020 16:38), Max: 38.6 (2020 03:30)  T(F): 99.1 (2020 16:38), Max: 101.5 (2020 03:30)  HR: 99 (2020 16:38) (76 - 99)  BP: 118/66 (2020 16:38) (102/63 - 118/66)  BP(mean): --  ABP: --  ABP(mean): --  RR: 18 (2020 16:38) (18 - 24)  SpO2: 90% (2020 16:38) (84% - 100%)          I&O's Detail    2020 07:01  -  2020 02:35  --------------------------------------------------------  IN:    heparin  Infusion.: 67 mL    sodium chloride 0.45%: 240 mL    sodium chloride 0.45%: 300 mL    sodium chloride 0.9%: 500 mL  Total IN: 1107 mL    OUT:  Total OUT: 0 mL    Total NET: 1107 mL              PE:    Elderly F lying in bed, proning  + NRB + NC in place  Awake    Remainder of exam deferred 2/2 COVID pandemic; will not change mgmt but will consume valuable PPE    LABS:    CBC Full  -  ( 2020 14:22 )  WBC Count : 1.27 K/uL  RBC Count : 3.03 M/uL  Hemoglobin : 8.9 g/dL  Hematocrit : 26.9 %  Platelet Count - Automated : 146 K/uL  Mean Cell Volume : 88.8 fl  Mean Cell Hemoglobin : 29.4 pg  Mean Cell Hemoglobin Concentration : 33.1 gm/dL  Auto Neutrophil # : x  Auto Lymphocyte # : x  Auto Monocyte # : x  Auto Eosinophil # : x  Auto Basophil # : x  Auto Neutrophil % : x  Auto Lymphocyte % : x  Auto Monocyte % : x  Auto Eosinophil % : x  Auto Basophil % : x        142  |  110<H>  |  85<H>  ----------------------------<  199<H>  4.1   |  18<L>  |  5.70<H>    Ca    8.1<L>      2020 14:22    TPro  7.6  /  Alb  2.7<L>  /  TBili  0.4  /  DBili  x   /  AST  107<H>  /  ALT  37  /  AlkPhos  142<H>      PT/INR - ( 2020 15:26 )   PT: 10.7 sec;   INR: 0.97 ratio         PTT - ( 2020 23:49 )  PTT:>200.0 sec  Urinalysis Basic - ( 2020 13:17 )    Color: Yellow / Appearance: Clear / S.010 / pH: x  Gluc: x / Ketone: Trace  / Bili: Negative / Urobili: Negative   Blood: x / Protein: 100 / Nitrite: Negative   Leuk Esterase: Trace / RBC: >50 /HPF / WBC 3-5   Sq Epi: x / Non Sq Epi: Occasional / Bacteria: Occasional      CAPILLARY BLOOD GLUCOSE        CARDIAC MARKERS ( 2020 10:22 )  2.200 ng/mL / x     / x     / x     / x      CARDIAC MARKERS ( 2020 15:26 )  1.815 ng/mL / x     / x     / x     / x          LIVER FUNCTIONS - ( 2020 07:35 )  Alb: 2.7 g/dL / Pro: 7.6 g/dL / ALK PHOS: 142 U/L / ALT: 37 U/L / AST: 107 U/L / GGT: x

## 2020-04-29 NOTE — PROGRESS NOTE ADULT - SUBJECTIVE AND OBJECTIVE BOX
infectious diseases progress note:    SANTOS GAYTAN is a 79y y. o. Female patient    COVID Patient    Allergies    No Known Allergies    Intolerances        ANTIBIOTICS/RELEVANT:  antimicrobials  piperacillin/tazobactam IVPB.. 3.375 Gram(s) IV Intermittent every 12 hours    immunologic:    OTHER:  acetaminophen  Suppository .. 650 milliGRAM(s) Rectal every 6 hours PRN  argatroban Infusion 2 MICROgram(s)/kG/Min IV Continuous <Continuous>  chlorhexidine 0.12% Liquid 15 milliLiter(s) Oral Mucosa every 12 hours  famotidine Injectable 20 milliGRAM(s) IV Push every 48 hours  lactobacillus acidophilus 1 Tablet(s) Oral two times a day  methylPREDNISolone sodium succinate Injectable 40 milliGRAM(s) IV Push daily  propofol Infusion 30 MICROgram(s)/kG/Min IV Continuous <Continuous>  sodium chloride 0.45% 1000 milliLiter(s) IV Continuous <Continuous>      Objective:  Vital Signs Last 24 Hrs  T(C): 36.6 (2020 07:45), Max: 37.3 (2020 16:38)  T(F): 97.8 (2020 07:45), Max: 99.1 (2020 16:38)  HR: 76 (2020 11:00) (76 - 116)  BP: 84/50 (2020 11:00) (84/50 - 140/83)  BP(mean): 63 (2020 11:00) (63 - 82)  RR: 30 (2020 11:00) (18 - 35)  SpO2: 92% (2020 11:00) (71% - 100%)    T(C): 36.6 (20 @ 07:45), Max: 39.4 (20 @ 19:37)  T(C): 36.6 (20 @ 07:45), Max: 39.4 (20 @ 19:37)  T(C): 36.6 (20 @ 07:45), Max: 39.4 (20 @ 19:37)    PHYSICAL EXAM:  HEENT: NC atraumatic  Neck: supple  Respiratory: no accessory muscle use, breathing comfortably  Cardiovascular: distant  Gastrointestinal: normal appearing, nondistended  Extremities: no clubbing, no cyanosis,      LABS:                          7.6    1.62  )-----------( 186      ( 2020 08:01 )             23.6       1.62  @ 08:01  1.27  @ 14:22  1.20  @ 07:35  1.78  @ 15:26          142  |  105  |  102<H>  ----------------------------<  255<H>  4.0   |  19<L>  |  6.60<H>    Ca    8.1<L>      2020 08:01  Phos  9.2       Mg     2.4         TPro  7.7  /  Alb  2.5<L>  /  TBili  0.6  /  DBili  x   /  AST  171<H>  /  ALT  48  /  AlkPhos  210<H>        Creatinine, Serum: 6.60 mg/dL (20 @ 08:01)  Creatinine, Serum: 5.70 mg/dL (20 @ 14:22)  Creatinine, Serum: 5.30 mg/dL (20 @ 07:35)  Creatinine, Serum: 4.21 mg/dL (20 @ 15:26)      PT/INR - ( 2020 15:26 )   PT: 10.7 sec;   INR: 0.97 ratio         PTT - ( 2020 08:01 )  PTT:> 200 sec  Urinalysis Basic - ( 2020 13:17 )    Color: Yellow / Appearance: Clear / S.010 / pH: x  Gluc: x / Ketone: Trace  / Bili: Negative / Urobili: Negative   Blood: x / Protein: 100 / Nitrite: Negative   Leuk Esterase: Trace / RBC: >50 /HPF / WBC 3-5   Sq Epi: x / Non Sq Epi: Occasional / Bacteria: Occasional            COVID RISK SCORE  Auto Neutrophil #: 0.63 K/uL (20 @ 08:01)  Auto Lymphocyte #: 0.72 K/uL (20 @ 08:01)  Auto Neutrophil #: 0.82 K/uL (20 @ 07:35)  Auto Lymphocyte #: 0.34 K/uL (20 @ 07:35)  Auto Neutrophil #: 1.25 K/uL (20 @ 15:26)  Auto Lymphocyte #: 0.46 K/uL (20 @ 15:26)    Lactate, Blood: 2.3 mmol/L (20 @ 15:27)    Auto Eosinophil #: 0.00 K/uL (20 @ 08:01)  Auto Eosinophil #: 0.00 K/uL (20 @ 07:35)  Auto Eosinophil #: 0.00 K/uL (20 @ 15:26)    Lactate Dehydrogenase, Serum: 992 U/L (20 @ 10:58)  Lactate Dehydrogenase, Serum: 653 U/L (20 @ 11:24)      Procalcitonin, Serum: 8.43 ng/mL (20 @ 22:21)    Troponin I, Serum: 2.030 ng/mL (20 @ 08:01)  Troponin I, Serum: 2.200 ng/mL (20 @ 10:22)  Troponin I, Serum: 1.815 ng/mL (20 @ 15:26)    Ferritin, Serum: 2919 ng/mL (20 @ 21:49)    Activated Partial Thromboplastin Time: > 200 sec (20 @ 08:01)  Activated Partial Thromboplastin Time: >200.0 sec (20 @ 23:49)  Activated Partial Thromboplastin Time: 195.1 sec (20 @ 14:51)  Activated Partial Thromboplastin Time: 30.5 sec (20 @ 15:26)  INR: 0.97 ratio (20 @ 15:26)    D-Dimer Assay, Quantitative: 2457 ng/mL DDU (20 @ 08:01)  D-Dimer Assay, Quantitative: 2041 ng/mL DDU (20 @ 14:51)  D-Dimer Assay, Quantitative: 2117 ng/mL DDU (20 @ 07:35)  D-Dimer Assay, Quantitative: 2288 ng/mL DDU (20 @ 15:26)        MICROBIOLOGY:              RADIOLOGY & ADDITIONAL STUDIES:

## 2020-04-29 NOTE — PROGRESS NOTE ADULT - SUBJECTIVE AND OBJECTIVE BOX
Neurology follow up note    SANTOS [id number]      Interval History:    Patient events noted intubated sedated     MEDICATIONS    acetaminophen  Suppository .. 650 milliGRAM(s) Rectal every 6 hours PRN  chlorhexidine 0.12% Liquid 15 milliLiter(s) Oral Mucosa every 12 hours  famotidine Injectable 20 milliGRAM(s) IV Push every 48 hours  heparin   Injectable 3500 Unit(s) IV Push every 6 hours PRN  heparin   Injectable 1500 Unit(s) IV Push every 6 hours PRN  heparin  Infusion.  Unit(s)/Hr IV Continuous <Continuous>  lactobacillus acidophilus 1 Tablet(s) Oral two times a day  methylPREDNISolone sodium succinate Injectable 40 milliGRAM(s) IV Push daily  piperacillin/tazobactam IVPB.. 3.375 Gram(s) IV Intermittent every 12 hours  propofol Infusion 30 MICROgram(s)/kG/Min IV Continuous <Continuous>  sodium chloride 0.45% 1000 milliLiter(s) IV Continuous <Continuous>      Allergies    No Known Allergies    Intolerances            Vital Signs Last 24 Hrs  T(C): 36.6 (2020 07:45), Max: 37.3 (2020 16:38)  T(F): 97.8 (2020 07:45), Max: 99.1 (2020 16:38)  HR: 76 (2020 11:00) (76 - 116)  BP: 84/50 (2020 11:00) (84/50 - 140/83)  BP(mean): 63 (2020 11:00) (63 - 82)  RR: 30 (2020 11:00) (18 - 35)  SpO2: 92% (2020 11:00) (71% - 100%)    REVIEW OF SYSTEMS:  intubated and sedated     PHYSICAL EXAMINATION:   HEENT:  Head:  Normocephalic, atraumatic.  Eyes:  No scleral icterus.  NECK:  Supple.  RESPIRATORY:  intubated  CARDIOVASCULAR:  S1 and S2 heard.  ABDOMEN:  Soft and nontender.  EXTREMITIES:  No clubbing or cyanosis were noted.      NEUROLOGIC:  The patient is intubated and sedated   Speech was fluent.  Smile was symmetric.  Motor:  Bilateral upper and bilateral lower no movement                LABS:  CBC Full  -  ( 2020 08:01 )  WBC Count : 1.62 K/uL  RBC Count : 2.64 M/uL  Hemoglobin : 7.6 g/dL  Hematocrit : 23.6 %  Platelet Count - Automated : 186 K/uL  Mean Cell Volume : 89.4 fl  Mean Cell Hemoglobin : 28.8 pg  Mean Cell Hemoglobin Concentration : 32.2 gm/dL  Auto Neutrophil # : 0.63 K/uL  Auto Lymphocyte # : 0.72 K/uL  Auto Monocyte # : 0.09 K/uL  Auto Eosinophil # : 0.00 K/uL  Auto Basophil # : 0.00 K/uL  Auto Neutrophil % : 38.9 %  Auto Lymphocyte % : 44.4 %  Auto Monocyte % : 5.6 %  Auto Eosinophil % : 0.0 %  Auto Basophil % : 0.0 %    Urinalysis Basic - ( 2020 13:17 )    Color: Yellow / Appearance: Clear / S.010 / pH: x  Gluc: x / Ketone: Trace  / Bili: Negative / Urobili: Negative   Blood: x / Protein: 100 / Nitrite: Negative   Leuk Esterase: Trace / RBC: >50 /HPF / WBC 3-5   Sq Epi: x / Non Sq Epi: Occasional / Bacteria: Occasional          142  |  105  |  102<H>  ----------------------------<  255<H>  4.0   |  19<L>  |  6.60<H>    Ca    8.1<L>      2020 08:01  Phos  9.2       Mg     2.4         TPro  7.7  /  Alb  2.5<L>  /  TBili  0.6  /  DBili  x   /  AST  171<H>  /  ALT  48  /  AlkPhos  210<H>      Hemoglobin A1C:     LIVER FUNCTIONS - ( 2020 08:01 )  Alb: 2.5 g/dL / Pro: 7.7 g/dL / ALK PHOS: 210 U/L / ALT: 48 U/L / AST: 171 U/L / GGT: x           Vitamin B12   PT/INR - ( 2020 15:26 )   PT: 10.7 sec;   INR: 0.97 ratio         PTT - ( 2020 08:01 )  PTT:> 200 sec      RADIOLOGY    ANALYSIS AND PLAN:  An 79-year-old with altered mental status, history of fall, and head trauma.  1.	For altered mental, suspect most likely metabolic encephalopathy which could be multifactorial secondary to COVID virus encephalopathy along with increased renal function.  Questionable any type of postconcussion type of symptoms with a history of head trauma, would rule out other metabolic causes.  2.	For COVID virus, monitor respiratory status inutbated   3.	For falls, appears to be mechanical in nature.  4.	For history of head trauma, the patient's neurologic emanation is nonfocal, will get CT of the head.  5.	Fall precautions.  6.	Advanced directives were discussed with the daughter at [phone].  Her name is Violette. 2020  7.	The patient's cognitive and functional status was evaluated.  8.	Greater than 45 minutes of time was spent with the patient, plan of care, reviewing data, speaking to the family and multidisciplinary healthcare team. Neurology follow up note    SANTOS [id number]      Interval History:    Patient events noted intubated sedated     MEDICATIONS    acetaminophen  Suppository .. 650 milliGRAM(s) Rectal every 6 hours PRN  chlorhexidine 0.12% Liquid 15 milliLiter(s) Oral Mucosa every 12 hours  famotidine Injectable 20 milliGRAM(s) IV Push every 48 hours  heparin   Injectable 3500 Unit(s) IV Push every 6 hours PRN  heparin   Injectable 1500 Unit(s) IV Push every 6 hours PRN  heparin  Infusion.  Unit(s)/Hr IV Continuous <Continuous>  lactobacillus acidophilus 1 Tablet(s) Oral two times a day  methylPREDNISolone sodium succinate Injectable 40 milliGRAM(s) IV Push daily  piperacillin/tazobactam IVPB.. 3.375 Gram(s) IV Intermittent every 12 hours  propofol Infusion 30 MICROgram(s)/kG/Min IV Continuous <Continuous>  sodium chloride 0.45% 1000 milliLiter(s) IV Continuous <Continuous>      Allergies    No Known Allergies    Intolerances            Vital Signs Last 24 Hrs  T(C): 36.6 (2020 07:45), Max: 37.3 (2020 16:38)  T(F): 97.8 (2020 07:45), Max: 99.1 (2020 16:38)  HR: 76 (2020 11:00) (76 - 116)  BP: 84/50 (2020 11:00) (84/50 - 140/83)  BP(mean): 63 (2020 11:00) (63 - 82)  RR: 30 (2020 11:00) (18 - 35)  SpO2: 92% (2020 11:00) (71% - 100%)    REVIEW OF SYSTEMS:  intubated and sedated     PHYSICAL EXAMINATION:   HEENT:  Head:  Normocephalic, atraumatic.  Eyes:  No scleral icterus.  NECK:  Supple.  RESPIRATORY:  intubated  CARDIOVASCULAR:  S1 and S2 heard.  ABDOMEN:  Soft and nontender.  EXTREMITIES:  No clubbing or cyanosis were noted.      NEUROLOGIC:  The patient is intubated and sedated   Speech was fluent.  Smile was symmetric.  Motor:  Bilateral upper and bilateral lower no movement                LABS:  CBC Full  -  ( 2020 08:01 )  WBC Count : 1.62 K/uL  RBC Count : 2.64 M/uL  Hemoglobin : 7.6 g/dL  Hematocrit : 23.6 %  Platelet Count - Automated : 186 K/uL  Mean Cell Volume : 89.4 fl  Mean Cell Hemoglobin : 28.8 pg  Mean Cell Hemoglobin Concentration : 32.2 gm/dL  Auto Neutrophil # : 0.63 K/uL  Auto Lymphocyte # : 0.72 K/uL  Auto Monocyte # : 0.09 K/uL  Auto Eosinophil # : 0.00 K/uL  Auto Basophil # : 0.00 K/uL  Auto Neutrophil % : 38.9 %  Auto Lymphocyte % : 44.4 %  Auto Monocyte % : 5.6 %  Auto Eosinophil % : 0.0 %  Auto Basophil % : 0.0 %    Urinalysis Basic - ( 2020 13:17 )    Color: Yellow / Appearance: Clear / S.010 / pH: x  Gluc: x / Ketone: Trace  / Bili: Negative / Urobili: Negative   Blood: x / Protein: 100 / Nitrite: Negative   Leuk Esterase: Trace / RBC: >50 /HPF / WBC 3-5   Sq Epi: x / Non Sq Epi: Occasional / Bacteria: Occasional          142  |  105  |  102<H>  ----------------------------<  255<H>  4.0   |  19<L>  |  6.60<H>    Ca    8.1<L>      2020 08:01  Phos  9.2       Mg     2.4         TPro  7.7  /  Alb  2.5<L>  /  TBili  0.6  /  DBili  x   /  AST  171<H>  /  ALT  48  /  AlkPhos  210<H>      Hemoglobin A1C:     LIVER FUNCTIONS - ( 2020 08:01 )  Alb: 2.5 g/dL / Pro: 7.7 g/dL / ALK PHOS: 210 U/L / ALT: 48 U/L / AST: 171 U/L / GGT: x           Vitamin B12   PT/INR - ( 2020 15:26 )   PT: 10.7 sec;   INR: 0.97 ratio         PTT - ( 2020 08:01 )  PTT:> 200 sec      RADIOLOGY    ANALYSIS AND PLAN:  An 79-year-old with altered mental status, history of fall, and head trauma.  1.	For altered mental, suspect most likely metabolic encephalopathy which could be multifactorial secondary to COVID virus encephalopathy along with increased renal function.  Questionable any type of postconcussion type of symptoms with a history of head trauma, would rule out other metabolic causes.  2.	For COVID virus, monitor respiratory status intubated    3.	For falls, appears to be mechanical in nature.  4.	For history of head trauma, the patient's neurologic emanation is nonfocal, will get CT of the head.  5.	Fall precautions.  6.	Advanced directives were discussed with the daughter at [phone].  Her name is Violette. 2020  7.	The patient's cognitive and functional status was evaluated.  8.	Greater than 45 minutes of time was spent with the patient, plan of care, reviewing data, speaking to the family and multidisciplinary healthcare team.

## 2020-04-29 NOTE — CONSULT NOTE ADULT - ASSESSMENT
A:    79yFemale  HD # 3  FULL CODE    Here for:    1. Acute hypoxic resp failure 2/2  2. B/L PNA 2/2  3. COVID infection  4. Cancer  5. KYLIE  6. MSOF  7. NSTEMI    P:/recommendations:    Elderly F with CA and dementia  In MSOF (KYLIE, CVS, pulm) 2/2 COVID-19 infection    RRT this AM for acute hypoxic resp failure 2/2 above    Pt SpO2 and WOB slowly improving on NC and NRB, would not intubate at this moment    Strongly advise discussing GOC with family, as Pt very high risk of not improving her condition regardless of measures taken    Given improving hypoxia, no need for ICU at this time    Medical team discussing goals of care with family at this time    May remain on floor with current oxygen therapy, self proning, continue current care A:    79yFemale  HD # 3  FULL CODE    Here for:    1. Acute hypoxic resp failure 2/2  2. B/L PNA 2/2  3. COVID infection  4. Cancer  5. KYLIE  6. MSOF  7. NSTEMI    P:/recommendations:    Elderly F with CA and dementia  In MSOF (KYLIE, CVS, pulm) 2/2 COVID-19 infection    RRT this AM for acute hypoxic resp failure 2/2 above    Pt SpO2 and WOB slowly improving on NC and NRB, would not intubate at this moment    Strongly advise discussing GOC with family, as Pt very high risk of not improving her condition regardless of measures taken    Given improving hypoxia, no need for ICU at this time    Medical team discussing goals of care with family at this time    May remain on floor with current oxygen therapy, self proning, continue current care    ADDENDUM #1: I was called by medical team that family desires "everything done." SpO2 80's on NRB. Not in respiratory distress. Pt is in MSOF with dementia and CA. At this time, aggressive intubation or resuscitation will not change her outcome. Pt may stay on floor. Strongly consider palliative consult and continued goals of care discussion. Discussed with eICU physician, who agrees with plan. A:    79yFemale  HD # 3  FULL CODE    Here for:    1. Acute hypoxic resp failure 2/2  2. B/L PNA 2/2  3. COVID infection  4. Cancer  5. KYLIE  6. MSOF  7. NSTEMI, type 2  8. HAGMA 2/2 azotemia    P:/recommendations:    Elderly F with CA and dementia  In MSOF (KYLIE, CVS, pulm) 2/2 COVID-19 infection    RRT this AM for acute hypoxic resp failure 2/2 above    Pt SpO2 and WOB slowly improving on NC and NRB, would not intubate at this moment    Strongly advise discussing GOC with family, as Pt very high risk of not improving her condition regardless of measures taken    Given improving hypoxia, no need for ICU at this time    Medical team discussing goals of care with family at this time    May remain on floor with current oxygen therapy, self proning, continue current care    ADDENDUM #1: I was called by medical team that family desires "everything done." SpO2 80's on NRB. Not in respiratory distress. Pt is in MSOF with dementia and CA. At this time, aggressive intubation or resuscitation will not change her outcome. Pt may stay on floor. Strongly consider palliative consult and continued goals of care discussion. Discussed with eICU physician, who agrees with plan.     ADDENDUM #2: I was called for rapid decompensation of Pt respiratory status. She requires intubation. I called Pt daughter myself at number listed on chart. After prolonged discussion, Pt daughter (with aid of sons and other family members) requests FULL CODE including intubation and CPR.     Pt to be transferred to ICU for intubation, mechanical ventilation and resuscitation.    Analgosedation with propofol drip + PRN fentanyl. Daily sedation vacations as determines by Pt pulmonary status.  HD monitoring. PRN vasopressors to maintain MAP > 65. Type 2 NSTEMI, Cards involved. Hemodynamics do not support beta blockade. Will give ASA. On full dose AC via heparin drip.  Emergently intubate. Initial vent settings VAC 28/350/10/1.0, f/u ABG and CXR.  NPO, place NGT.   VTE with heparin drip, follow protocol. PUD ppx with pepcid.   Zosyn renally dosed broad spectrum. f/u Cx's.   KYLIE 2/2 ATN from COVID. Place campbell, f/u UOP. Renal fxn improving.   Place CVC, campbell, ETT, NGT, maintain all.  f/u labs. Inflammatory biomarkers worsening.     Total CC Time, NOT INCLUDING procedures: 115 minutes A:    79yFemale  HD # 3  FULL CODE    Here for:    1. Acute hypoxic resp failure 2/2  2. B/L PNA 2/2  3. COVID infection  4. Cancer  5. KYLIE  6. MSOF  7. NSTEMI, type 2  8. HAGMA 2/2 azotemia    P:/recommendations:    Elderly F with CA and dementia  In MSOF (KYLIE, CVS, pulm) 2/2 COVID-19 infection    RRT this AM for acute hypoxic resp failure 2/2 above    Pt SpO2 and WOB slowly improving on NC and NRB, would not intubate at this moment    Strongly advise discussing GOC with family, as Pt very high risk of not improving her condition regardless of measures taken    Given improving hypoxia, no need for ICU at this time    Medical team discussing goals of care with family at this time    May remain on floor with current oxygen therapy, self proning, continue current care    ADDENDUM #1: I was called by medical team that family desires "everything done." SpO2 80's on NRB. Not in respiratory distress. Pt is in MSOF with dementia and CA. At this time, aggressive intubation or resuscitation will not change her outcome. Pt may stay on floor. Strongly consider palliative consult and continued goals of care discussion. Discussed with eICU physician, who agrees with plan.     ADDENDUM #2: I was called for rapid decompensation of Pt respiratory status. She requires intubation. I called Pt daughter myself at number listed on chart. After prolonged discussion, Pt daughter (with aid of sons and other family members) requests FULL CODE including intubation and CPR.     Pt to be transferred to ICU for intubation, mechanical ventilation and resuscitation.    Analgosedation with propofol drip + PRN fentanyl. Daily sedation vacations as determines by Pt pulmonary status.  HD monitoring. PRN vasopressors to maintain MAP > 65. Type 2 NSTEMI, Cards involved. Hemodynamics do not support beta blockade. Will give ASA. On full dose AC via heparin drip.  Emergently intubate. Initial vent settings VAC 28/350/10/1.0, f/u ABG and CXR.  NPO, place NGT.   VTE with heparin drip, follow protocol. PUD ppx with pepcid.   Zosyn renally dosed broad spectrum. f/u Cx's.   KYLIE 2/2 ATN from COVID. Place campbell, f/u UOP. Renal fxn improving.   Place CVC, campbell, ETT, NGT, maintain all.  Continue isotonic bicarb drip as alkali therapy with HAGMA.  f/u labs. Inflammatory biomarkers worsening.     Total CC Time, NOT INCLUDING procedures: 115 minutes

## 2020-04-29 NOTE — PROGRESS NOTE ADULT - ASSESSMENT
80 yo female with h/o dementia, HTN, ovarian cancer (on chemo completed 3/6 round on 3/21), h/o obstructive uropathy with right ureteral stent [facility] admitted with + COVID 19, KYLIE, hypoxia, elevated troponin and poor po intake now with acute respiratory failure requiring intubation 04/29.    Neuro: sedation with propofol drip + PRN fentanyl.   Cardio: HD stable. PRN vasopressors to maintain MAP > 65. Type 2 NSTEMI continue heparin drip. Trend Troponins. Obtain ECHO.  Pulm: Intubated 4/29, AC/CMV (Assist Control/ Continuous Mandatory Ventilation), RR 28/ /FiO2 80/ PEEP 10. ABG reviewed repeat. CXR repeat.   ID: continue zosyn given elevated procalc likely superimposed infection. f/u Blood cx.  GI: NPO, place NGT. Continue pepcid  Renal: KYLIE 2/2 ATN from COVID, worsening kidney function. Chan placed. Continue isotonic bicarb drip as alkali therapy with HAGMA  Heme: VTE with heparin drip, follow protocol. Hb low today transfuse prn, repeat H&H  Dispo: Full code, prognosis remains poor at this time

## 2020-04-30 NOTE — PROGRESS NOTE ADULT - ASSESSMENT
The patient is an 79 year old female with a history of HTN, ovarian cancer, dementia who presents with AMS, hypoxia, hypotension in the setting of COVID-19, course c/b KYLIE, respiratory failure, NSTEMI.    Plan:  - Troponin peaked at 2.2 and trended down, likely demand ischemia from respiratory failure, KYLIE although cannot exclude NSTEMI  - Echocardiogram with normal LV systolic function, normal pulmonary pressures despite mechanical ventilation, no significant valve issues  - Renal function continues to worsen  - On bicarb drip  - On zosyn  - Transitioned from heparin drip to argatroban drip - unknown benefit compared to heparin  - Hemoglobin low at 7.6 - continue to trend  - Continue norepinephrine and titrate to MAP of 65  - Continue vasopressin  - On propofol  - Mechanical ventilation  - ICU care  - Overall prognosis very poor with little likelihood of improvement  - Comfort care would be most appropriate

## 2020-04-30 NOTE — PHARMACOTHERAPY INTERVENTION NOTE - COMMENTS
COVID-19 Positive Patient, spoke to MD (Agustina), added stop date to methylprednisolone, total 5 days of therapy.

## 2020-04-30 NOTE — PROGRESS NOTE ADULT - SUBJECTIVE AND OBJECTIVE BOX
infectious diseases progress note:    SANTOS GAYTAN is a 79y y. o. Female patient    COVID Patient    Allergies    No Known Allergies    Intolerances        ANTIBIOTICS/RELEVANT:  antimicrobials  piperacillin/tazobactam IVPB.. 3.375 Gram(s) IV Intermittent every 12 hours    immunologic:    OTHER:  acetaminophen  Suppository .. 650 milliGRAM(s) Rectal every 6 hours PRN  argatroban Infusion 2 MICROgram(s)/kG/Min IV Continuous <Continuous>  chlorhexidine 0.12% Liquid 15 milliLiter(s) Oral Mucosa every 12 hours  famotidine Injectable 20 milliGRAM(s) IV Push every 48 hours  lactobacillus acidophilus 1 Tablet(s) Oral two times a day  methylPREDNISolone sodium succinate Injectable 40 milliGRAM(s) IV Push daily  norepinephrine Infusion 0.05 MICROgram(s)/kG/Min IV Continuous <Continuous>  propofol Infusion 30 MICROgram(s)/kG/Min IV Continuous <Continuous>  sodium bicarbonate  Infusion 0.25 mEq/kG/Hr IV Continuous <Continuous>  vasopressin Infusion 0.04 Unit(s)/Min IV Continuous <Continuous>      Objective:  Vital Signs Last 24 Hrs  T(C): 37.1 (2020 11:37), Max: 37.1 (2020 11:37)  T(F): 98.7 (:37), Max: 98.7 (:37)  HR: 76 (:37) (51 - 81)  BP: 109/48 (2020 11:37) (73/38 - 133/60)  BP(mean): 69 (:37) (51 - 87)  RR: 38 (:37) (28 - 80)  SpO2: 99% (2020 11:37) (90% - 100%)    T(C): 37.1 (20 @ 11:37), Max: 37.3 (20 @ 16:38)  T(C): 37.1 (20 @ 11:37), Max: 39.4 (20 @ 19:37)  T(C): 37.1 (20 @ 11:37), Max: 39.4 (20 @ 19:37)    PHYSICAL EXAM:  HEENT: NC atraumatic  Neck: supple  Respiratory: no accessory muscle use, breathing comfortably  Cardiovascular: distant  Gastrointestinal: normal appearing, nondistended  Extremities: no clubbing, no cyanosis,      LABS:                          5.7    3.97  )-----------( 151      ( 2020 06:54 )             18.3       3.97  @ 06:54  1.62  @ 08:01  1.27  @ 14:22  1.20  @ 07:35  1.78  @ 15:26      04    142  |  102  |  109<H>  ----------------------------<  168<H>  5.7<H>   |  8<LL>  |  7.00<H>    Ca    7.1<L>      2020 06:54  Phos  13.2       Mg     2.8         TPro  6.2  /  Alb  1.8<L>  /  TBili  0.7  /  DBili  x   /  AST  501<H>  /  ALT  141<H>  /  AlkPhos  168<H>        Creatinine, Serum: 7.00 mg/dL (20 @ 06:54)  Creatinine, Serum: 6.60 mg/dL (20 @ 08:01)  Creatinine, Serum: 5.70 mg/dL (20 @ 14:22)  Creatinine, Serum: 5.30 mg/dL (20 @ 07:35)  Creatinine, Serum: 4.21 mg/dL (20 @ 15:26)      PT/INR - ( 2020 06:54 )   PT: 14.4 sec;   INR: 1.28 ratio         PTT - ( 2020 09:53 )  PTT:73.4 sec  Urinalysis Basic - ( 2020 13:17 )    Color: Yellow / Appearance: Clear / S.010 / pH: x  Gluc: x / Ketone: Trace  / Bili: Negative / Urobili: Negative   Blood: x / Protein: 100 / Nitrite: Negative   Leuk Esterase: Trace / RBC: >50 /HPF / WBC 3-5   Sq Epi: x / Non Sq Epi: Occasional / Bacteria: Occasional            COVID RISK SCORE  Auto Neutrophil #: 1.79 K/uL (20 @ 06:54)  Auto Lymphocyte #: 1.97 K/uL (20 @ 06:54)  Auto Neutrophil #: 0.63 K/uL (20 @ 08:01)  Auto Lymphocyte #: 0.72 K/uL (20 @ 08:01)  Auto Neutrophil #: 0.82 K/uL (20 @ 07:35)  Auto Lymphocyte #: 0.34 K/uL (20 @ 07:35)  Auto Neutrophil #: 1.25 K/uL (20 @ 15:26)  Auto Lymphocyte #: 0.46 K/uL (20 @ 15:26)    Lactate, Blood: 2.3 mmol/L (20 @ 15:27)    Auto Eosinophil #: 0.00 K/uL (20 @ 06:54)  Auto Eosinophil #: 0.00 K/uL (20 @ 08:01)  Auto Eosinophil #: 0.00 K/uL (20 @ 07:35)  Auto Eosinophil #: 0.00 K/uL (20 @ 15:26)    Lactate Dehydrogenase, Serum: 1450 U/L (20 @ 08:16)  Lactate Dehydrogenase, Serum: 992 U/L (20 @ 10:58)  Lactate Dehydrogenase, Serum: 653 U/L (20 @ 11:24)    Sedimentation Rate, Erythrocyte: >140 (20 @ 06:54)    Procalcitonin, Serum: 8.43 ng/mL (20 @ 22:21)    Troponin I, Serum: 2.000 ng/mL (20 @ 08:22)  Troponin I, Serum: 1.600 ng/mL (20 @ 06:54)  Troponin I, Serum: 1.560 ng/mL (20 @ 01:12)  Troponin I, Serum: 1.660 ng/mL (20 @ 16:57)  Troponin I, Serum: 2.030 ng/mL (20 @ 08:01)  Troponin I, Serum: 2.200 ng/mL (20 @ 10:22)  Troponin I, Serum: 1.815 ng/mL (20 @ 15:26)    Creatine Kinase, Serum: 1132 U/L (20 @ 06:54)        Ferritin, Serum: 5796 ng/mL (20 @ 11:00)  Ferritin, Serum: 2919 ng/mL (20 @ 21:49)        Activated Partial Thromboplastin Time: 73.4 sec (20 @ 09:53)  INR: 1.28 ratio (20 @ 06:54)  Activated Partial Thromboplastin Time: 105.0 sec (20 @ 06:54)  Activated Partial Thromboplastin Time: 123.3 sec (20 @ 00:39)  Activated Partial Thromboplastin Time: 117.1 sec (20 @ 20:56)  Activated Partial Thromboplastin Time: 169.7 sec (20 @ 16:22)  Activated Partial Thromboplastin Time: > 200 sec (20 @ 08:01)  Activated Partial Thromboplastin Time: >200.0 sec (20 @ 23:49)  Activated Partial Thromboplastin Time: 195.1 sec (20 @ 14:51)  Activated Partial Thromboplastin Time: 30.5 sec (20 @ 15:26)  INR: 0.97 ratio (20 @ 15:26)    D-Dimer Assay, Quantitative: 2905 ng/mL DDU (20 @ 06:54)  D-Dimer Assay, Quantitative: 2457 ng/mL DDU (20 @ 08:01)  D-Dimer Assay, Quantitative: 2041 ng/mL DDU (20 @ 14:51)  D-Dimer Assay, Quantitative: 2117 ng/mL DDU (20 @ 07:35)  D-Dimer Assay, Quantitative: 2288 ng/mL DDU (20 @ 15:26)        MICROBIOLOGY:              RADIOLOGY & ADDITIONAL STUDIES:

## 2020-04-30 NOTE — PROGRESS NOTE ADULT - SUBJECTIVE AND OBJECTIVE BOX
Chief Complaint: AMS, hypoxia    Interval Events: Transitioned to argatroban drip. Started on pressors.    Review of Systems:  Unable to obtain    Physical Exam:  Vital Signs Last 24 Hrs  T(C): 32.8 (30 Apr 2020 04:15), Max: 36.3 (29 Apr 2020 19:00)  T(F): 91 (30 Apr 2020 04:15), Max: 97.3 (29 Apr 2020 19:00)  HR: 68 (30 Apr 2020 07:00) (51 - 99)  BP: 108/53 (30 Apr 2020 07:00) (73/38 - 117/56)  BP(mean): 73 (30 Apr 2020 07:00) (51 - 81)  RR: 33 (30 Apr 2020 07:00) (28 - 80)  SpO2: 98% (30 Apr 2020 07:00) (90% - 100%)  General: Sedated  HEENT: Intubated  Neck: No JVD, no carotid bruit  Lungs: Coarse bilaterally  CV: RRR, nl S1/S2, no M/R/G  Abdomen: S/NT/ND, +BS  Extremities: No LE edema, no cyanosis  Neuro: Non-focal  Skin: No rash    Labs:             04-29    142  |  105  |  102<H>  ----------------------------<  255<H>  4.0   |  19<L>  |  6.60<H>    Ca    8.1<L>      29 Apr 2020 08:01  Phos  9.2     04-29  Mg     2.4     04-29    TPro  7.7  /  Alb  2.5<L>  /  TBili  0.6  /  DBili  x   /  AST  171<H>  /  ALT  48  /  AlkPhos  210<H>  04-29                        5.7    3.97  )-----------( 151      ( 30 Apr 2020 06:54 )             18.3     Telemetry: Sinus rhythm, PVCs, NSVT

## 2020-04-30 NOTE — PROGRESS NOTE ADULT - ASSESSMENT
80 yo female with h/o dementia, HTN, ovarian cancer (on chemo completed 3/6 round on 3/21), h/o obstructive uropathy with right ureteral stent [facility] admitted with + COVID 19, KYLIE, hypoxia, elevated troponin and poor po intake now with acute respiratory failure requiring intubation 04/29 now on pressors and transitioned to argatroban    Neuro: continue sedation with propofol drip  Cardio: Started on pressors overnight. Titrate for MAP 65-70. Transitioned to Argatroban drip. Continue to Trend Troponins. ECHO shows EF 60%.  Pulm: Intubated 4/29, AC/CMV (Assist Control/ Continuous Mandatory Ventilation), RR 32/ /FiO2 70/ PEEP 8. ABG reviewed repeat.   ID: continue zosyn given elevated procalc likely superimposed infection. f/u Blood cx.  GI: NPO, Continue pepcid  Renal: KYLIE 2/2 ATN from COVID, worsening kidney function. Chan placed. Continue isotonic bicarb drip as alkali therapy with HAGMA  Heme: Hb low today, transfuse 2 Unit PRBC, monitor for bleeding and trend H&H. DC AC given low hb.  Dispo: Full code, prognosis remains poor at this time

## 2020-04-30 NOTE — PROGRESS NOTE ADULT - SUBJECTIVE AND OBJECTIVE BOX
SANTOS GAYTAN  79y  Female    Patient is a 79y old  Female who presents with a chief complaint of AMS (2020 11:41)    vented and sedated  anuric  hypotensive on pressors      PAST MEDICAL & SURGICAL HISTORY:  Uterine cancer  Hypertension  Dementia  No significant past surgical history      PHYSICAL EXAM:    T(C): 35.8 (20 @ 11:52), Max: 37.1 (20 @ 11:37)  HR: 74 (20 @ 11:52) (51 - 81)  BP: 87/43 (20 @ 11:52) (73/38 - 133/60)  RR: 37 (20 @ 11:52) (28 - 80)  SpO2: 95% (20 @ 11:52) (91% - 100%)  Wt(kg): --    I&O's Detail    2020 07:01  -  2020 07:00  --------------------------------------------------------  IN:    heparin  Infusion.: 18 mL    norepinephrine Infusion: 820.2 mL    propofol Infusion: 152.2 mL    sodium chloride 0.45%: 1920 mL    vasopressin Infusion: 21.6 mL  Total IN: 2932 mL    OUT:    Indwelling Catheter - Urethral: 10 mL  Total OUT: 10 mL    Total NET: 2922 mL      2020 07:  -  2020 13:13  --------------------------------------------------------  IN:    norepinephrine Infusion: 1907 mL    Packed Red Blood Cells: 239 mL    propofol Infusion: 9.4 mL    sodium bicarbonate  Infusion: 600 mL    sodium chloride 0.45%: 150 mL    vasopressin Infusion: 9.6 mL  Total IN: 2915 mL    OUT:  Total OUT: 0 mL    Total NET: 2915 mL          Respiratory: clear anteriorly, decreased BS at bases  Cardiovascular: S1 S2  Gastrointestinal: soft NT ND +BS  Extremities: edema   Neuro: unresponsive on the vent    MEDICATIONS  (STANDING):  argatroban Infusion 2 MICROgram(s)/kG/Min (5.4 mL/Hr) IV Continuous <Continuous>  chlorhexidine 0.12% Liquid 15 milliLiter(s) Oral Mucosa every 12 hours  famotidine Injectable 20 milliGRAM(s) IV Push every 48 hours  lactobacillus acidophilus 1 Tablet(s) Oral two times a day  methylPREDNISolone sodium succinate Injectable 40 milliGRAM(s) IV Push daily  norepinephrine Infusion 0.05 MICROgram(s)/kG/Min (4.22 mL/Hr) IV Continuous <Continuous>  piperacillin/tazobactam IVPB.. 3.375 Gram(s) IV Intermittent every 12 hours  propofol Infusion 30 MICROgram(s)/kG/Min (8.1 mL/Hr) IV Continuous <Continuous>  sodium bicarbonate  Infusion 0.25 mEq/kG/Hr (75 mL/Hr) IV Continuous <Continuous>  vasopressin Infusion 0.04 Unit(s)/Min (2.4 mL/Hr) IV Continuous <Continuous>    MEDICATIONS  (PRN):  acetaminophen  Suppository .. 650 milliGRAM(s) Rectal every 6 hours PRN Temp greater or equal to 38C (100.4F), Mild Pain (1 - 3)                            5.7    3.97  )-----------( 151      ( 2020 06:54 )             18.3       04-30    142  |  102  |  109<H>  ----------------------------<  168<H>  5.7<H>   |  8<LL>  |  7.00<H>    Ca    7.1<L>      2020 06:54  Phos  13.2       Mg     2.8         TPro  6.2  /  Alb  1.8<L>  /  TBili  0.7  /  DBili  x   /  AST  501<H>  /  ALT  141<H>  /  AlkPhos  168<H>  0430      Creatinine Trend: Creatinine Trend: 7.00<--, 6.60<--, 5.70<--, 5.30<--, 4.21<--    Urinalysis Basic - ( 2020 13:17 )    Color: Yellow / Appearance: Clear / S.010 / pH: x  Gluc: x / Ketone: Trace  / Bili: Negative / Urobili: Negative   Blood: x / Protein: 100 / Nitrite: Negative   Leuk Esterase: Trace / RBC: >50 /HPF / WBC 3-5   Sq Epi: x / Non Sq Epi: Occasional / Bacteria: Occasional

## 2020-04-30 NOTE — PROVIDER CONTACT NOTE (EICU) - ACTION/TREATMENT ORDERED:
1amp sodium bicarb IVP stat, increase bicarb drip from 80 -->150/hr. Discussed with bedside RN, ROXANA wells.

## 2020-04-30 NOTE — PROGRESS NOTE ADULT - ASSESSMENT
78 yo female with h/o dementia, HTN, ovarian cancer, now COVID PNA and VDRF. She also has KYLIE with oligo anuria.   Basing on the overall comorbidities and hemodynamic compromise, she is not a candidate for dialysis at this time.   Family spoke to Dr. Rodriguez over the telephone. Will monitor on daily basis and evaluate for further care.   Prognosis is very poor. comfort care is probably ideal at this time. Case discussed with ICU team.

## 2020-04-30 NOTE — PROGRESS NOTE ADULT - SUBJECTIVE AND OBJECTIVE BOX
Patient is a 79y old  Female who presents with a chief complaint of AMS (2020 01:07)    24 hour events: transitioned to argatroban drip and started on pressors.    REVIEW OF SYSTEMS  unable to obtain    T(F): 91 (20 @ 08:09), Max: 97.3 (20 @ 19:00)  HR: 75 (20 @ 08:52) (51 - 79)  BP: 101/44 (20 @ 08:00) (73/38 - 117/56)  RR: 34 (20 @ 08:00) (28 - 80)  SpO2: 92% (20 @ 08:52) (90% - 100%)  Wt(kg): --    Mode: AC/ CMV (Assist Control/ Continuous Mandatory Ventilation), RR (machine): 32, TV (machine): 350, FiO2: 70, PEEP: 8        I&O's Summary     @ 07:  -   @ 07:00  --------------------------------------------------------  IN: 2932 mL / OUT: 10 mL / NET: 2922 mL     @ 07:01  -   @ 10:24  --------------------------------------------------------  IN: 302.3 mL / OUT: 0 mL / NET: 302.3 mL      PHYSICAL EXAM  General:   CNS:   HEENT:   Resp:   CVS:   Abd:   Ext:   Skin:     MEDICATIONS  piperacillin/tazobactam IVPB.. IV Intermittent    norepinephrine Infusion IV Continuous    dextrose 50% Injectable IV Push  insulin regular  human recombinant. SubCutaneous  methylPREDNISolone sodium succinate Injectable IV Push  vasopressin Infusion IV Continuous      acetaminophen  Suppository .. Rectal PRN  propofol Infusion IV Continuous      argatroban Infusion IV Continuous    famotidine Injectable IV Push      sodium bicarbonate  Infusion IV Continuous      chlorhexidine 0.12% Liquid Oral Mucosa    lactobacillus acidophilus Oral                          5.7    3.97  )-----------( 151      ( 2020 06:54 )             18.3           142  |  102  |  109<H>  ----------------------------<  168<H>  5.7<H>   |  8<LL>  |  7.00<H>    Ca    7.1<L>      2020 06:54  Phos  13.2       Mg     2.8         TPro  6.2  /  Alb  1.8<L>  /  TBili  0.7  /  DBili  x   /  AST  501<H>  /  ALT  141<H>  /  AlkPhos  168<H>  30      CARDIAC MARKERS ( 2020 08:22 )  2.000 ng/mL / x     / x     / x     / x      CARDIAC MARKERS ( 2020 06:54 )  1.600 ng/mL / x     / 1132 U/L / x     / x      CARDIAC MARKERS ( 2020 01:12 )  1.560 ng/mL / x     / x     / x     / x      CARDIAC MARKERS ( 2020 16:57 )  1.660 ng/mL / x     / x     / x     / x      CARDIAC MARKERS ( 2020 08:01 )  2.030 ng/mL / x     / x     / x     / x          PT/INR - ( 2020 06:54 )   PT: 14.4 sec;   INR: 1.28 ratio         PTT - ( 2020 09:53 )  PTT:73.4 sec  Urinalysis Basic - ( 2020 13:17 )    Color: Yellow / Appearance: Clear / S.010 / pH: x  Gluc: x / Ketone: Trace  / Bili: Negative / Urobili: Negative   Blood: x / Protein: 100 / Nitrite: Negative   Leuk Esterase: Trace / RBC: >50 /HPF / WBC 3-5   Sq Epi: x / Non Sq Epi: Occasional / Bacteria: Occasional      .Urine Clean Catch (Midstream)   <10,000 CFU/mL Normal Urogenital Eusebia --  @ 18:53  .Blood Blood-Peripheral   No growth to date. --  @ 22:05        Radiology: ***  Bedside lung ultrasound: ***  Bedside ECHO: ***      CENTRAL LINE: Y  20  ARDON: Y 20  A-LINE: N    GLOBAL ISSUE/BEST PRACTICE  Analgesia: Y  Sedation: Y  CAM-ICU: Neg  HOB elevation: yes  Stress ulcer prophylaxis: Y  VTE prophylaxis: Y  Glycemic control: Y  Nutrition: N    CODE STATUS: Full code  GO discussion: Y

## 2020-04-30 NOTE — PROGRESS NOTE ADULT - ATTENDING COMMENTS
31 minutes of critical care time spent with the patient and coordinating care with nursing, hospitalist, and consultants. Patient is critically ill requiring ICU care due to respiratory failure, KYLIE, NSTEMI. The patient is high risk for deterioration and death.
32 minutes of critical care time spent with the patient and coordinating care with nursing, hospitalist, and consultants. Patient is critically ill requiring ICU care due to respiratory failure, KYLIE, NSTEMI. The patient is high risk for deterioration and death.
79F h/o dementia, HTN, ovarian cancer (on chemo completed 3/6 round on 3/21), obstructive uropathy s/p R ureteral stent a/w + COVID19, KYLIE, hypoxia, elevated troponin c/b RRT for acute hypoxic respiratory failure not improved with NRB, ultimately intubated overnight with anuric renal failure    - adequately sedated with propofol   - pressor support with levophed for distributive shock in setting of covid and vasodilation from propofol, wean as able; Goal MAP > 60-65  - Type 2 NSTEMI on admission; troponins still uptrending though in setting of renal failure; continue argatraban   - ABG reviewed, increasing O2 requirements now on settings of 28/350/12/100 with increased airway pressures  - inflammatory markers uptrending, receiving course solumedrol; pts overall prognosis poor and unlikely to benefit from tocilizamab dose in setting of pre-existing immunosuppression from chemo   - continue zosyn given elevated pro-calc likely superimposed infection; blood cultures 4/27 negative and UCx normal urogen ted  - continue pepcid GI ppx  - worsening acidosis in setting of ATN with anuric renal failure and hyperkalemia with continually worsening renal function; mixed metabolic/respiratory acidosis on bicarb infusion; pt is NOT an HD cadidate, discussed with daughter in detail  - argatraban for full AC in setting of hypercoaguable state of Covid and NSTEMI  - last chemo 3/21 remains pancytopenic; received 2unit pRBC for Hb of 5.7 with repeat CBC post-transfusion    Pt remains critically ill with worsening multiorgan failure. Explained to daughter in great detail that she is unlikely to survive this illness. Expressed understanding but remains full code at this time.
79F h/o dementia, HTN, ovarian cancer (on chemo completed 3/6 round on 3/21), obstructive uropathy s/p R ureteral stent a/w + COVID19, KYLIE, hypoxia, elevated troponin c/b RRT for acute hypoxic respiratory failure not improved with NRB, ultimately intubated overnight with anuric renal failure      - adequately sedated with propofol   - pressor support with levophed for distributive shock in setting of covid and vasodilation from propofol, wean as able; Goal MAP > 60-65  - Type 2 NSTEMI on admission; heparin gtt changed to argatraban for theoretically risk of increased viral shedding; continue to trend troponins  - follow up official TTE performed earlier today  - ETT adjusted (initial CXR with R mainstem intubation)  - ABG reviewed, settings of 28/350/10/80 with appropriate plateau and driving pressures  - inflammatory markers uptrending, receiving course solumedrol; pts overall prognosis poor and unlikely to benefit from tocilizamab dose   - continue zosyn given elevated pro-calc likely superimposed infection; blood cultures 4/27 negative and UCx normal urogen ted  - continue pepcid GI ppx  - ATN with anuric renal failure with continually worsening renal function; metabolic acidosis on isotonic bicarb infusion; pt is NOT an HD cadidate, discussed with daughter in detail  - argatraban for full AC in setting of hypercoaguable state of Covid and NSTEMI  - last chemo 3/21 remains pancytopenic    Poor overall prognosis. Discussed with daughter, Violette, in detail - at this time remains full code
I have discussed care plan with patient and HCP,expressed understanding of problems treatment and their effect and side effects, alternatives in detail,I have asked if they have any questions and concerns and appropriately addressed them to best of my ability  Reviewed all diagonostic tests, lab results and drug drug interactions, and medications

## 2020-04-30 NOTE — PROVIDER CONTACT NOTE (CRITICAL VALUE NOTIFICATION) - ACTION/TREATMENT ORDERED:
Continue following with heparin nomogram
MD Padilla
MD wells
as per [facility] Rate ^ 32 and pt given bicarb
Orders to follow.
continue to monitor

## 2020-04-30 NOTE — PROGRESS NOTE ADULT - SUBJECTIVE AND OBJECTIVE BOX
80 yo female with h/o dementia, HTN, ovarian cancer (on chemo just finished 3/6 round on 3/21), currently has right renal stent from [facility] transferred from [facility] ED for admission for + COVID, renal failure, hypoxia, elevated troponin. Pt adm to floor and had respiratory decompensated requiring intubation. Family was notified and wanted full code.       BRIEF HOSPITAL COURSE: Pt remains intubated and in critical condition.  She's on Levo and now added Vasopressin for hemodynamic support. Shes also on Bicarb drip for severe metabolic acidosis.         PAST MEDICAL & SURGICAL HISTORY:  Uterine cancer  Hypertension  Dementia  No significant past surgical history        Hosp day #3d    Vent day # 2  Mode: AC/ CMV (Assist Control/ Continuous Mandatory Ventilation)  RR (machine): 28  TV (machine): 350  FiO2: 80  PEEP: 8  ITime: 1  MAP: 14  PIP: 31        Vital signs / Reviewed and Physical Exam Performed where pertinent and urgently required    Lab / Radiology  studies / ABG / Meds -  reviewed and interpreted into the assessment and treatment plan.      Assessment/Plan/Therapeutic interventions      Neuro - Sedation on Diprivan drip  to facilitate safe ventilation    CV -  will add pressor support as needed to maintain MAP 65           Avoiding fluid challenges          QTC monitoring while on Azithromycin and Hydroxychloroquine.    Pulm -  ARDS-NET 4-6cc/kg IBW TV as able to maintain plateau pressures <30              Prone ventilation consideration as feasible  Pa02/Fi02 < 150 on Fi02 >60% and PEEP at least 5               Vent bundle Reviewed     GI -  PPI  Enteric feeds as tolerated in tandem with NMB and prone ventilation    Renal - Even to negative fluid balance as tolerated by hemodynamics and renal fx.  Feeds to be provided in lieu of IVF.     Heme -     ID - ABX discontinuation based on discussion with ID in conjunction with clinical features, culture data, and judicious procalcitonin monitoring.      Endo -  Aggressive glycemic control to limit FS glucose to < 180mg/dl.      COVID 19 specific considerations and therapeutic  options based on the available and rapidly changing literature    Goals of care considerations:  Ongoing assessment for patient specific treatment options based on progression or decline.  I have involved the family with updates and requests in guidance for medical decision making.          38  Minutes of critical care team spent in the management of this critically ill COVID-19 Patient / PUI patient with continuous assessments and interventions based on the interpretation of multiple databases. 78 yo female with h/o dementia, HTN, ovarian cancer (on chemo just finished 3/6 round on 3/21), currently has right renal stent from [facility] transferred from [facility] ED for admission for + COVID, renal failure, hypoxia, elevated troponin. Pt adm to floor and had respiratory decompensated requiring intubation. Family was notified and wanted full code.       BRIEF HOSPITAL COURSE: Pt remains intubated and in critical condition.  She's on Levo and now added Vasopressin for hemodynamic support. Shes also on Bicarb drip for severe metabolic acidosis.         PAST MEDICAL & SURGICAL HISTORY:  Uterine cancer  Hypertension  Dementia  No significant past surgical history        Hosp day #3d    Vent day # 2  Mode: AC/ CMV (Assist Control/ Continuous Mandatory Ventilation)  RR (machine): 28  TV (machine): 350  FiO2: 80  PEEP: 8  ITime: 1  MAP: 14  PIP: 31        Vital signs / Reviewed and Physical Exam Performed where pertinent and urgently required    Lab / Radiology  studies / ABG / Meds -  reviewed and interpreted into the assessment and treatment plan.      Assessment/Plan/Therapeutic interventions      Neuro - Sedation on Diprivan drip  to facilitate safe ventilation    CV -  will add pressor support as needed to maintain MAP 65           Avoiding fluid challenges          QTC monitoring while on Azithromycin and Hydroxychloroquine.    Pulm -  ARDS-NET 4-6cc/kg IBW TV as able to maintain plateau pressures <30              Prone ventilation consideration as feasible  Pa02/Fi02 < 150 on Fi02 >60% and PEEP at least 5               Vent bundle Reviewed     GI -  PPI  Enteric feeds as tolerated in tandem with NMB and prone ventilation    Renal - Even to negative fluid balance as tolerated by hemodynamics and renal fx.  Feeds to be provided in lieu of IVF.     Heme - Pharmacologic DVT P in addition to SCD's in low risk,  full AC in high risk  following D- Dimer clinical course and doppler studied where appropriate. On Argatroban drip, f/u PTT    ID - ABX discontinuation based on discussion with ID in conjunction with clinical features, culture data, and judicious procalcitonin monitoring.      Endo -  Aggressive glycemic control to limit FS glucose to < 180mg/dl.      COVID 19 specific considerations and therapeutic  options based on the available and rapidly changing literature    Goals of care considerations:  Spoke to daughter via telephone and updated her with patient's decline. She expressed to me that she and her brothers wants everything done including CPR.           40  Minutes of critical care team spent in the management of this critically ill COVID-19 Patient / PUI patient with continuous assessments and interventions based on the interpretation of multiple databases.

## 2020-04-30 NOTE — PROGRESS NOTE ADULT - ASSESSMENT
80 yo female with h/o dementia, HTN, ovarian cancer (on chemo completed 3/6 round on 3/21), h/o obstructive uropathy with right ureteral stent resident of [facility] admitted with + COVID 19, KYLIE, hypoxia, elevated troponin and poor po intake.   COVID-19---high risk period for decompensation  (7-14 days post symptom onset), critical to determine date of symptom onset,  avoid aerosolizing procedures, avoid excessive blood draws so limit lab testing and draws - do recommend for hospitalized patients  -For all patients: daily D-dimer, BMP, CBC w diff, D-dimer, [CBC w diff to follow NLR (NLR <3 low vs >5 high)  D-dimer (<1,000 vs >1,000)]-other labs at admission to risk stratify and predict clinical course, (would repeat on day of decompensation)  Procalcitonin (>0.2 associated with more severe disease),  Ferritin (lower risk <450 vs >850),  CRP (low risk <2 and higher risk >6)  and if prognosis is unclear or if immunomodulators being considered: LDH, ESR, PT/PTT, CK, total, CKMB mass assay, lactate, troponin, fibrinogen,, IL-6 (and other interleukins as indicated) -antibiotics only if there is concern for a bacterial process,  consider methylprednisolone (SOLU-medrol) 1mg/kg/day IV x 5 days at onset of increased oxygen requirement (potential to finish w PO or extend to 7 days and in some cases an oral taper) and IL6 inhibition during this critical window but with consideration of impact on risk of infectious complications  (consider proning and tolerating lower oxygen saturation to avoid intubation).   --DVT prophylaxis broadly recommended and with very high incidence of PEs patients sick enough to require steroids or TOCI may benefit from full dose anticoagulation with lovenox 1mg/kg subcut BID (renally adjusted as needed to QD) and would track daily D-dimers, PT/PTT, fibrinogen, CRP, procalcitonin and platelet counts from day of increased oxygen requirement and for high risk patients requiring steroids or D-dimer >3x ULN  (>700) consider discharge on oral anticoagulation with rivaroxaban (Xarelto)  10mg PO QD or Eliquis 2.5mg PO BID  x 6 weeks in high risk patients, unclear if ASA of benefit  4/28 NLR 0.82/0.34=2.4  Changed Rocephin to Zosyn  4/29-NLR 0.63/0.72=0.9  pt now intubated in ICU -reasonable to continue abx in this context pending further data  4/30 day 3 of Zosyn-cont abx

## 2020-04-30 NOTE — PROGRESS NOTE ADULT - SUBJECTIVE AND OBJECTIVE BOX
Neurology follow up note    SANTOS [id number]      Interval History:    Patient  intubated sedated     MEDICATIONS    acetaminophen  Suppository .. 650 milliGRAM(s) Rectal every 6 hours PRN  argatroban Infusion 2 MICROgram(s)/kG/Min IV Continuous <Continuous>  chlorhexidine 0.12% Liquid 15 milliLiter(s) Oral Mucosa every 12 hours  famotidine Injectable 20 milliGRAM(s) IV Push every 48 hours  lactobacillus acidophilus 1 Tablet(s) Oral two times a day  methylPREDNISolone sodium succinate Injectable 40 milliGRAM(s) IV Push daily  norepinephrine Infusion 0.05 MICROgram(s)/kG/Min IV Continuous <Continuous>  piperacillin/tazobactam IVPB.. 3.375 Gram(s) IV Intermittent every 12 hours  propofol Infusion 30 MICROgram(s)/kG/Min IV Continuous <Continuous>  sodium bicarbonate  Infusion 0.25 mEq/kG/Hr IV Continuous <Continuous>  vasopressin Infusion 0.04 Unit(s)/Min IV Continuous <Continuous>      Allergies    No Known Allergies    Intolerances            Vital Signs Last 24 Hrs  T(C): 37.1 (2020 11:37), Max: 37.1 (2020 11:37)  T(F): 98.7 (2020 11:37), Max: 98.7 (2020 11:37)  HR: 76 (2020 11:37) (51 - 81)  BP: 109/48 (2020 11:37) (73/38 - 133/60)  BP(mean): 69 (2020 11:37) (51 - 87)  RR: 38 (2020 11:37) (28 - 80)  SpO2: 99% (2020 11:37) (90% - 100%)        REVIEW OF SYSTEMS:  intubated and sedated     PHYSICAL EXAMINATION:   HEENT:  Head:  Normocephalic, atraumatic.  Eyes:  No scleral icterus.  NECK:  Supple.  RESPIRATORY:  intubated  CARDIOVASCULAR:  S1 and S2 heard.  ABDOMEN:  Soft and nontender.  EXTREMITIES:  No clubbing or cyanosis were noted.      NEUROLOGIC:  The patient is intubated and sedated   Speech was fluent.  Smile was symmetric.  Motor:  Bilateral upper and bilateral lower no movement                 LABS:  CBC Full  -  ( 2020 06:54 )  WBC Count : 3.97 K/uL  RBC Count : 1.98 M/uL  Hemoglobin : 5.7 g/dL  Hematocrit : 18.3 %  Platelet Count - Automated : 151 K/uL  Mean Cell Volume : 92.4 fl  Mean Cell Hemoglobin : 28.8 pg  Mean Cell Hemoglobin Concentration : 31.1 gm/dL  Auto Neutrophil # : 1.79 K/uL  Auto Lymphocyte # : 1.97 K/uL  Auto Monocyte # : 0.17 K/uL  Auto Eosinophil # : 0.00 K/uL  Auto Basophil # : 0.01 K/uL  Auto Neutrophil % : 45.0 %  Auto Lymphocyte % : 49.6 %  Auto Monocyte % : 4.3 %  Auto Eosinophil % : 0.0 %  Auto Basophil % : 0.3 %    Urinalysis Basic - ( 2020 13:17 )    Color: Yellow / Appearance: Clear / S.010 / pH: x  Gluc: x / Ketone: Trace  / Bili: Negative / Urobili: Negative   Blood: x / Protein: 100 / Nitrite: Negative   Leuk Esterase: Trace / RBC: >50 /HPF / WBC 3-5   Sq Epi: x / Non Sq Epi: Occasional / Bacteria: Occasional          142  |  102  |  109<H>  ----------------------------<  168<H>  5.7<H>   |  8<LL>  |  7.00<H>    Ca    7.1<L>      2020 06:54  Phos  13.2       Mg     2.8         TPro  6.2  /  Alb  1.8<L>  /  TBili  0.7  /  DBili  x   /  AST  501<H>  /  ALT  141<H>  /  AlkPhos  168<H>  30    Hemoglobin A1C:     LIVER FUNCTIONS - ( 2020 06:54 )  Alb: 1.8 g/dL / Pro: 6.2 g/dL / ALK PHOS: 168 U/L / ALT: 141 U/L / AST: 501 U/L / GGT: x           Vitamin B12   PT/INR - ( 2020 06:54 )   PT: 14.4 sec;   INR: 1.28 ratio         PTT - ( 2020 09:53 )  PTT:73.4 sec      RADIOLOGY    ANALYSIS AND PLAN:  An 79-year-old with altered mental status, history of fall, and head trauma.  1.	For altered mental, suspect most likely metabolic encephalopathy which could be multifactorial secondary to COVID virus encephalopathy along with increased renal function.    2.	For COVID virus, monitor respiratory status intubated    3.	For falls, appears to be mechanical in nature..  4.	Fall precautions.  5.	monitor H/H as needed   6.	Advanced directives were discussed with the daughter at [phone].  Her name is Violette. 2020  7.	exam limited sedated   8.	Greater than 45 minutes of time was spent with the patient, plan of care, reviewing data, speaking to the family and multidisciplinary healthcare team.

## 2020-05-01 NOTE — CHART NOTE - NSCHARTNOTEFT_GEN_A_CORE
Code blue called, pt became bradycardic without palpable pulses and went into Asystole on monitor. ACLS protocol initiated. Pt on max dose levo/vaso, ACLS protocol initiated without any change pt remained in asystole.      P/E   General: Unresponsive to verbal/tactile/painful stimulis  Cardiac: no pulses, POCUS shows Cardiac standstill  Lungs: no breath sounds   Tele asystole     Death pronounced at 3:09PM    Spoke to EICU and made aware.     Spoke to daughter via phone and made aware.

## 2020-05-01 NOTE — DISCHARGE NOTE FOR THE EXPIRED PATIENT - HOSPITAL COURSE
78 y/o F w/ pmh of dementia, htn, ovarian ca (on chemo completed 3/6 appx on 3/21), obstructive uropathy w/ R. ureteral stent, presented to [facility] on 4/27/2020 from [facility] admitted to the floors for KYLIE, hypoxia, 2/2 to COVID, hospital course complicated by worsening respiratory failure requiring intubation on 4/29/2020 and transferred to the MICU, since pt has continued to declined despite aggressive measures, currently sedated and intubated on high vent settings, w/ NSTEMI( on full dose AC), worsening hypoxic respiratory failure, Acute renal failure w/ mixed metabolic/respiratory acidosis (on bicarb gtt), pt was deemed to be an poor HD candidate per renal and currently in multiorgan failure, overnight pt started to become bradycardic/hypotensive despite high dose pressors, daughter (Violette Persaud) contacted and requested all aggressive measures including CPR. Pt was given 2 round of atropine with brief improvement in HR/BP and again becomes bradycardic without any palpable pulses CODE BLUE called pt given more atropine/epi, CPR started despite all aggressive measures pt remained in asystole, pt was produced at 309AM. Daughter Violette Persaud contacted and informed of death and condolences offered to family.

## 2020-05-01 NOTE — DISCHARGE NOTE FOR THE EXPIRED PATIENT - SECONDARY DIAGNOSIS.
Acute respiratory failure with hypoxia Hypoxia Acute renal failure, unspecified acute renal failure type

## 2020-05-02 LAB
CULTURE RESULTS: SIGNIFICANT CHANGE UP
CULTURE RESULTS: SIGNIFICANT CHANGE UP
SPECIMEN SOURCE: SIGNIFICANT CHANGE UP
SPECIMEN SOURCE: SIGNIFICANT CHANGE UP

## 2020-06-02 PROCEDURE — 86901 BLOOD TYPING SEROLOGIC RH(D): CPT

## 2020-06-02 PROCEDURE — 85014 HEMATOCRIT: CPT

## 2020-06-02 PROCEDURE — 92950 HEART/LUNG RESUSCITATION CPR: CPT

## 2020-06-02 PROCEDURE — P9016: CPT

## 2020-06-02 PROCEDURE — 87086 URINE CULTURE/COLONY COUNT: CPT

## 2020-06-02 PROCEDURE — 71045 X-RAY EXAM CHEST 1 VIEW: CPT

## 2020-06-02 PROCEDURE — 86923 COMPATIBILITY TEST ELECTRIC: CPT

## 2020-06-02 PROCEDURE — 84484 ASSAY OF TROPONIN QUANT: CPT

## 2020-06-02 PROCEDURE — 36430 TRANSFUSION BLD/BLD COMPNT: CPT

## 2020-06-02 PROCEDURE — 93306 TTE W/DOPPLER COMPLETE: CPT

## 2020-06-02 PROCEDURE — 86900 BLOOD TYPING SEROLOGIC ABO: CPT

## 2020-06-02 PROCEDURE — 84100 ASSAY OF PHOSPHORUS: CPT

## 2020-06-02 PROCEDURE — 85027 COMPLETE CBC AUTOMATED: CPT

## 2020-06-02 PROCEDURE — 94002 VENT MGMT INPAT INIT DAY: CPT

## 2020-06-02 PROCEDURE — 36600 WITHDRAWAL OF ARTERIAL BLOOD: CPT

## 2020-06-02 PROCEDURE — 76770 US EXAM ABDO BACK WALL COMP: CPT

## 2020-06-02 PROCEDURE — 84443 ASSAY THYROID STIM HORMONE: CPT

## 2020-06-02 PROCEDURE — 85652 RBC SED RATE AUTOMATED: CPT

## 2020-06-02 PROCEDURE — 94003 VENT MGMT INPAT SUBQ DAY: CPT

## 2020-06-02 PROCEDURE — 85610 PROTHROMBIN TIME: CPT

## 2020-06-02 PROCEDURE — 36415 COLL VENOUS BLD VENIPUNCTURE: CPT

## 2020-06-02 PROCEDURE — 85379 FIBRIN DEGRADATION QUANT: CPT

## 2020-06-02 PROCEDURE — 85018 HEMOGLOBIN: CPT

## 2020-06-02 PROCEDURE — 86140 C-REACTIVE PROTEIN: CPT

## 2020-06-02 PROCEDURE — 80048 BASIC METABOLIC PNL TOTAL CA: CPT

## 2020-06-02 PROCEDURE — 81001 URINALYSIS AUTO W/SCOPE: CPT

## 2020-06-02 PROCEDURE — 99285 EMERGENCY DEPT VISIT HI MDM: CPT

## 2020-06-02 PROCEDURE — 82962 GLUCOSE BLOOD TEST: CPT

## 2020-06-02 PROCEDURE — 82803 BLOOD GASES ANY COMBINATION: CPT

## 2020-06-02 PROCEDURE — 83735 ASSAY OF MAGNESIUM: CPT

## 2020-06-02 PROCEDURE — 99221 1ST HOSP IP/OBS SF/LOW 40: CPT

## 2020-06-02 PROCEDURE — 86850 RBC ANTIBODY SCREEN: CPT

## 2020-06-02 PROCEDURE — 83036 HEMOGLOBIN GLYCOSYLATED A1C: CPT

## 2020-06-02 PROCEDURE — 82550 ASSAY OF CK (CPK): CPT

## 2020-06-02 PROCEDURE — 82728 ASSAY OF FERRITIN: CPT

## 2020-06-02 PROCEDURE — 80053 COMPREHEN METABOLIC PANEL: CPT

## 2020-06-02 PROCEDURE — 85730 THROMBOPLASTIN TIME PARTIAL: CPT

## 2020-06-02 PROCEDURE — 83615 LACTATE (LD) (LDH) ENZYME: CPT

## 2022-01-05 NOTE — H&P ADULT - PROBLEM/PLAN-2
Fall Risk Factors  Elderly / Older adult (>65 years old), On oxygen and ambulates with cane    Is the patient 65 or older?  yes    Fall Prevention Strategies   Bed is locked and in lowest position, Call light within reach, Non-slip footwear, Risk for fall identifier placed and Side rails up x 2    Education Provided  Patient/patient's visitor educated on Call light operation, Specific fall risk factors  and Notifying staff of need for repositioning or mobility    GOVIND Falls Handout:  \"Preventing Falls and Injury While in the Hospital\" GOVIND given: yes    Response to education:  Patient/patient's visitor verbalized understanding.       DISPLAY PLAN FREE TEXT
